# Patient Record
Sex: FEMALE | Race: BLACK OR AFRICAN AMERICAN | Employment: PART TIME | URBAN - METROPOLITAN AREA
[De-identification: names, ages, dates, MRNs, and addresses within clinical notes are randomized per-mention and may not be internally consistent; named-entity substitution may affect disease eponyms.]

---

## 2019-11-21 ENCOUNTER — OFFICE VISIT (OUTPATIENT)
Dept: PRIMARY CARE CLINIC | Age: 34
End: 2019-11-21
Payer: COMMERCIAL

## 2019-11-21 ENCOUNTER — HOSPITAL ENCOUNTER (OUTPATIENT)
Age: 34
Setting detail: SPECIMEN
Discharge: HOME | End: 2019-11-21
Payer: COMMERCIAL

## 2019-11-21 VITALS
WEIGHT: 165 LBS | DIASTOLIC BLOOD PRESSURE: 75 MMHG | OXYGEN SATURATION: 98 % | HEIGHT: 63 IN | HEART RATE: 79 BPM | BODY MASS INDEX: 29.23 KG/M2 | SYSTOLIC BLOOD PRESSURE: 109 MMHG

## 2019-11-21 DIAGNOSIS — N76.0 ACUTE VAGINITIS: Primary | ICD-10-CM

## 2019-11-21 DIAGNOSIS — N76.0 ACUTE VAGINITIS: ICD-10-CM

## 2019-11-21 PROCEDURE — 99202 OFFICE O/P NEW SF 15 MIN: CPT | Performed by: INTERNAL MEDICINE

## 2019-11-21 PROCEDURE — G8427 DOCREV CUR MEDS BY ELIG CLIN: HCPCS | Performed by: INTERNAL MEDICINE

## 2019-11-21 PROCEDURE — G8419 CALC BMI OUT NRM PARAM NOF/U: HCPCS | Performed by: INTERNAL MEDICINE

## 2019-11-21 PROCEDURE — G8484 FLU IMMUNIZE NO ADMIN: HCPCS | Performed by: INTERNAL MEDICINE

## 2019-11-21 PROCEDURE — 1036F TOBACCO NON-USER: CPT | Performed by: INTERNAL MEDICINE

## 2019-11-21 RX ORDER — METRONIDAZOLE 500 MG/1
500 TABLET ORAL 3 TIMES DAILY
Qty: 21 TABLET | Refills: 0 | Status: SHIPPED | OUTPATIENT
Start: 2019-11-21 | End: 2019-11-28

## 2019-11-21 ASSESSMENT — PATIENT HEALTH QUESTIONNAIRE - PHQ9
SUM OF ALL RESPONSES TO PHQ QUESTIONS 1-9: 0
2. FEELING DOWN, DEPRESSED OR HOPELESS: 0
SUM OF ALL RESPONSES TO PHQ QUESTIONS 1-9: 0
1. LITTLE INTEREST OR PLEASURE IN DOING THINGS: 0
SUM OF ALL RESPONSES TO PHQ9 QUESTIONS 1 & 2: 0

## 2019-11-22 LAB
DIRECT EXAM: ABNORMAL
Lab: ABNORMAL
SPECIMEN DESCRIPTION: ABNORMAL

## 2020-01-26 ENCOUNTER — OFFICE VISIT (OUTPATIENT)
Dept: PRIMARY CARE CLINIC | Age: 35
End: 2020-01-26
Payer: COMMERCIAL

## 2020-01-26 VITALS
TEMPERATURE: 97.7 F | HEART RATE: 80 BPM | WEIGHT: 168 LBS | DIASTOLIC BLOOD PRESSURE: 83 MMHG | HEIGHT: 63 IN | BODY MASS INDEX: 29.77 KG/M2 | SYSTOLIC BLOOD PRESSURE: 125 MMHG

## 2020-01-26 PROCEDURE — 1036F TOBACCO NON-USER: CPT | Performed by: FAMILY MEDICINE

## 2020-01-26 PROCEDURE — G8484 FLU IMMUNIZE NO ADMIN: HCPCS | Performed by: FAMILY MEDICINE

## 2020-01-26 PROCEDURE — 99213 OFFICE O/P EST LOW 20 MIN: CPT | Performed by: FAMILY MEDICINE

## 2020-01-26 PROCEDURE — G8427 DOCREV CUR MEDS BY ELIG CLIN: HCPCS | Performed by: FAMILY MEDICINE

## 2020-01-26 PROCEDURE — G8419 CALC BMI OUT NRM PARAM NOF/U: HCPCS | Performed by: FAMILY MEDICINE

## 2020-01-26 RX ORDER — AZITHROMYCIN 250 MG/1
TABLET, FILM COATED ORAL
Qty: 1 PACKET | Refills: 0 | Status: SHIPPED | OUTPATIENT
Start: 2020-01-26 | End: 2020-09-09

## 2020-01-26 RX ORDER — BROMPHENIRAMINE MALEATE, PSEUDOEPHEDRINE HYDROCHLORIDE, AND DEXTROMETHORPHAN HYDROBROMIDE 2; 30; 10 MG/5ML; MG/5ML; MG/5ML
5 SYRUP ORAL 4 TIMES DAILY PRN
Qty: 180 ML | Refills: 1 | Status: SHIPPED | OUTPATIENT
Start: 2020-01-26 | End: 2020-09-09

## 2020-01-26 RX ORDER — FLUTICASONE PROPIONATE 50 MCG
1 SPRAY, SUSPENSION (ML) NASAL 2 TIMES DAILY
Qty: 1 BOTTLE | Refills: 2 | Status: SHIPPED | OUTPATIENT
Start: 2020-01-26 | End: 2020-09-09

## 2020-01-26 ASSESSMENT — ENCOUNTER SYMPTOMS
SINUS PAIN: 1
GASTROINTESTINAL NEGATIVE: 1
ALLERGIC/IMMUNOLOGIC NEGATIVE: 1
CHEST TIGHTNESS: 0
CHOKING: 0
SINUS PRESSURE: 1
SORE THROAT: 1
EYES NEGATIVE: 1
SHORTNESS OF BREATH: 0
COUGH: 1
STRIDOR: 0
RHINORRHEA: 1
WHEEZING: 1

## 2020-01-26 ASSESSMENT — PATIENT HEALTH QUESTIONNAIRE - PHQ9
2. FEELING DOWN, DEPRESSED OR HOPELESS: 0
SUM OF ALL RESPONSES TO PHQ QUESTIONS 1-9: 0
1. LITTLE INTEREST OR PLEASURE IN DOING THINGS: 0
SUM OF ALL RESPONSES TO PHQ QUESTIONS 1-9: 0
SUM OF ALL RESPONSES TO PHQ9 QUESTIONS 1 & 2: 0

## 2020-01-26 NOTE — LETTER
[address] [facility]  [facility] [address]  Phone: [phone]  Fax: [phone]    Murphy Driver MD        January 26, 2020     Patient: Migel Keenan   YOB: 1985   Date of Visit: 1/26/2020       To Whom it May Concern:    Migel Keenan was seen in my clinic on 1/26/2020. She may return to work on 1/28/2020. Please excuse her for 1/25/2020 also . If you have any questions or concerns, please don't hesitate to call.     Sincerely,         Murphy Driver MD

## 2020-01-26 NOTE — PROGRESS NOTES
Products        Health Maintenance   Topic Date Due    Varicella Vaccine (1 of 2 - 2-dose childhood series) 02/24/1988    DTaP/Tdap/Td vaccine (1 - Tdap) 02/24/1998    HIV screen  02/24/2002    Cervical cancer screen  02/24/2008    Flu vaccine (1) 09/01/2019    Pneumococcal 0-64 years Vaccine  Aged Out       Subjective:     Review of Systems   Constitutional: Negative. HENT: Positive for congestion, postnasal drip, rhinorrhea, sinus pressure, sinus pain and sore throat. Eyes: Negative. Respiratory: Positive for cough and wheezing. Negative for choking, chest tightness, shortness of breath and stridor. Cardiovascular: Negative. Gastrointestinal: Negative. Endocrine: Negative. Genitourinary: Negative. Musculoskeletal: Negative. Skin: Negative. Allergic/Immunologic: Negative. Neurological: Negative. Hematological: Negative. Psychiatric/Behavioral: Negative. All other systems reviewed and are negative. Objective:     Physical Exam  Vitals signs and nursing note reviewed. Constitutional:       Appearance: She is well-developed. HENT:      Head: Normocephalic and atraumatic. Right Ear: External ear normal.      Left Ear: External ear normal.      Nose: Congestion and rhinorrhea present. Right Turbinates: Enlarged, swollen and pale. Left Turbinates: Enlarged, swollen and pale. Right Sinus: Maxillary sinus tenderness and frontal sinus tenderness present. Left Sinus: Frontal sinus tenderness present. Eyes:      Conjunctiva/sclera: Conjunctivae normal.      Pupils: Pupils are equal, round, and reactive to light. Neck:      Musculoskeletal: Normal range of motion and neck supple. Cardiovascular:      Rate and Rhythm: Normal rate and regular rhythm. Heart sounds: Normal heart sounds. Pulmonary:      Effort: Pulmonary effort is normal.      Breath sounds: Wheezing present.    Abdominal:      General: Bowel sounds are normal. Palpations: Abdomen is soft. Musculoskeletal: Normal range of motion. Skin:     General: Skin is warm and dry. Neurological:      Mental Status: She is alert and oriented to person, place, and time. Deep Tendon Reflexes: Reflexes are normal and symmetric. Psychiatric:         Behavior: Behavior normal.         Thought Content: Thought content normal.         Judgment: Judgment normal.       /83 (Site: Right Upper Arm, Position: Sitting, Cuff Size: Medium Adult)   Pulse 80   Temp 97.7 °F (36.5 °C) (Oral)   Ht 5' 3\" (1.6 m)   Wt 168 lb (76.2 kg)   BMI 29.76 kg/m²     Assessment:          Diagnosis Orders   1. Acute bacterial sinusitis  azithromycin (ZITHROMAX Z-ERIN) 250 MG tablet    brompheniramine-pseudoephedrine-DM 2-30-10 MG/5ML syrup    fluticasone (FLONASE) 50 MCG/ACT nasal spray   2. Upper respiratory tract infection, unspecified type  azithromycin (ZITHROMAX Z-ERIN) 250 MG tablet    brompheniramine-pseudoephedrine-DM 2-30-10 MG/5ML syrup    fluticasone (FLONASE) 50 MCG/ACT nasal spray             Plan:      Return if symptoms worsen or fail to improve. Fluids and rest.  Ibuprofen as needed fever and pain. Follow-up with PCP 2 to 3 days. No orders of the defined types were placed in this encounter. Orders Placed This Encounter   Medications    azithromycin (ZITHROMAX Z-ERIN) 250 MG tablet     Sig: Take 2 tablets (500 mg) on Day 1, and then take 1 tablet (250 mg) on days 2 through 5. Dispense:  1 packet     Refill:  0    brompheniramine-pseudoephedrine-DM 2-30-10 MG/5ML syrup     Sig: Take 5 mLs by mouth 4 times daily as needed for Cough     Dispense:  180 mL     Refill:  1    fluticasone (FLONASE) 50 MCG/ACT nasal spray     Si spray by Nasal route 2 times daily for 14 days     Dispense:  1 Bottle     Refill:  2       Patient given educational materials - see patient instructions. Discussed use, benefit, and side effects of prescribed medications.   All patientquestions answered. Pt voiced understanding. Reviewed health maintenance. Instructedto continue current medications, diet and exercise. Patient agreed with treatmentplan. Follow up as directed.      Electronically signed by Zahida Durbin MD on 1/26/2020 at 10:23 AM

## 2020-09-09 ENCOUNTER — HOSPITAL ENCOUNTER (OUTPATIENT)
Age: 35
Setting detail: SPECIMEN
Discharge: HOME | End: 2020-09-09
Payer: COMMERCIAL

## 2020-09-09 ENCOUNTER — OFFICE VISIT (OUTPATIENT)
Dept: PRIMARY CARE CLINIC | Age: 35
End: 2020-09-09
Payer: COMMERCIAL

## 2020-09-09 VITALS
DIASTOLIC BLOOD PRESSURE: 57 MMHG | TEMPERATURE: 99.1 F | HEART RATE: 80 BPM | BODY MASS INDEX: 29.76 KG/M2 | WEIGHT: 168 LBS | SYSTOLIC BLOOD PRESSURE: 120 MMHG | OXYGEN SATURATION: 97 %

## 2020-09-09 LAB
DIRECT EXAM: ABNORMAL
Lab: ABNORMAL
SPECIMEN DESCRIPTION: ABNORMAL

## 2020-09-09 PROCEDURE — G8427 DOCREV CUR MEDS BY ELIG CLIN: HCPCS | Performed by: INTERNAL MEDICINE

## 2020-09-09 PROCEDURE — 1036F TOBACCO NON-USER: CPT | Performed by: INTERNAL MEDICINE

## 2020-09-09 PROCEDURE — 99213 OFFICE O/P EST LOW 20 MIN: CPT | Performed by: INTERNAL MEDICINE

## 2020-09-09 PROCEDURE — G8419 CALC BMI OUT NRM PARAM NOF/U: HCPCS | Performed by: INTERNAL MEDICINE

## 2020-09-09 RX ORDER — METRONIDAZOLE 500 MG/1
500 TABLET ORAL 3 TIMES DAILY
Qty: 30 TABLET | Refills: 0 | Status: SHIPPED | OUTPATIENT
Start: 2020-09-09 | End: 2020-09-19

## 2020-09-09 RX ORDER — FLUCONAZOLE 150 MG/1
150 TABLET ORAL ONCE
Qty: 1 TABLET | Refills: 1 | Status: SHIPPED | OUTPATIENT
Start: 2020-09-09 | End: 2020-09-09

## 2020-09-09 NOTE — PROGRESS NOTES
[address] [place] [address]  Dept: [phone]  Dept Fax: [phone]    Aneudy Clinton is a 37 y.o. female who presents to the urgent care today for her medicalconditions/complaints as noted below. Aneudy Clinton is c/o of Vaginal Discharge (yellow discharge, onset 3-4 days ago, has a fish like odor )      HPI:     Vaginal Discharge   The patient's primary symptoms include vaginal discharge. This is a new problem. The current episode started in the past 7 days. The problem has been unchanged. The patient is experiencing no pain. The vaginal discharge was malodorous, white and thick. There has been no bleeding. Nothing aggravates the symptoms. She has tried nothing for the symptoms. The treatment provided no relief. No past medical history on file. Current Outpatient Medications   Medication Sig Dispense Refill    Lurasidone HCl (LATUDA PO) Take by mouth      metroNIDAZOLE (FLAGYL) 500 MG tablet Take 1 tablet by mouth 3 times daily for 10 days 30 tablet 0    fluconazole (DIFLUCAN) 150 MG tablet Take 1 tablet by mouth once for 1 dose 1 tablet 1     No current facility-administered medications for this visit. Allergies   Allergen Reactions    Peanut-Containing Drug Products        Health Maintenance   Topic Date Due    Varicella vaccine (1 of 2 - 2-dose childhood series) 02/24/1988    HIV screen  02/24/2002    DTaP/Tdap/Td vaccine (1 - Tdap) 02/24/2006    Cervical cancer screen  02/24/2008    Flu vaccine (1) 09/01/2020    Hepatitis A vaccine  Aged Out    Hepatitis B vaccine  Aged Out    Hib vaccine  Aged Out    Meningococcal (ACWY) vaccine  Aged Out    Pneumococcal 0-64 years Vaccine  Aged Out       Subjective:      Review of Systems   Genitourinary: Positive for vaginal discharge. All other systems reviewed and are negative. Objective:     Physical Exam  Vitals signs reviewed.    Constitutional:       Appearance: Normal appearance. HENT:      Head: Normocephalic and atraumatic. Skin:     General: Skin is warm and dry. Neurological:      General: No focal deficit present. Mental Status: She is alert and oriented to person, place, and time. Psychiatric:         Mood and Affect: Mood normal.         Behavior: Behavior normal.       BP (!) 120/57 (Site: Right Upper Arm, Position: Sitting, Cuff Size: Small Adult)   Pulse 80   Temp 99.1 °F (37.3 °C) (Temporal)   Wt 168 lb (76.2 kg)   SpO2 97%   BMI 29.76 kg/m²       Assessment:       Diagnosis Orders   1. Acute vaginitis  Vaginitis DNA Probe       Plan:      No follow-ups on file. Orders Placed This Encounter   Medications    metroNIDAZOLE (FLAGYL) 500 MG tablet     Sig: Take 1 tablet by mouth 3 times daily for 10 days     Dispense:  30 tablet     Refill:  0    fluconazole (DIFLUCAN) 150 MG tablet     Sig: Take 1 tablet by mouth once for 1 dose     Dispense:  1 tablet     Refill:  1     Orders Placed This Encounter   Procedures    Vaginitis DNA Probe     Standing Status:   Future     Standing Expiration Date:   9/9/2021            Patient given educational materials - see patient instructions. Discussed use, benefit, and side effects of prescribed medications. All patientquestions answered. Pt voiced understanding.     Electronically signed by Joanie Griffiths MD on 9/9/2020at 1:55 PM

## 2020-12-22 ENCOUNTER — HOSPITAL ENCOUNTER (OUTPATIENT)
Age: 35
Setting detail: SPECIMEN
Discharge: HOME | End: 2020-12-22
Payer: COMMERCIAL

## 2020-12-22 LAB
ABSOLUTE EOS #: 0.24 K/UL (ref 0–0.44)
ABSOLUTE IMMATURE GRANULOCYTE: <0.03 K/UL (ref 0–0.3)
ABSOLUTE LYMPH #: 1.9 K/UL (ref 1.1–3.7)
ABSOLUTE MONO #: 0.42 K/UL (ref 0.1–1.2)
ALBUMIN SERPL-MCNC: 4.1 G/DL (ref 3.5–5.2)
ALBUMIN/GLOBULIN RATIO: 1.3 (ref 1–2.5)
ALP BLD-CCNC: 65 U/L (ref 35–104)
ALT SERPL-CCNC: 14 U/L (ref 5–33)
ANION GAP SERPL CALCULATED.3IONS-SCNC: 10 MMOL/L (ref 9–17)
AST SERPL-CCNC: 17 U/L
BASOPHILS # BLD: 1 % (ref 0–2)
BASOPHILS ABSOLUTE: 0.05 K/UL (ref 0–0.2)
BILIRUB SERPL-MCNC: 0.23 MG/DL (ref 0.3–1.2)
BUN BLDV-MCNC: 10 MG/DL (ref 6–20)
BUN/CREAT BLD: ABNORMAL (ref 9–20)
CALCIUM SERPL-MCNC: 9.2 MG/DL (ref 8.6–10.4)
CHLORIDE BLD-SCNC: 108 MMOL/L (ref 98–107)
CHOLESTEROL/HDL RATIO: 2.4
CHOLESTEROL: 197 MG/DL
CO2: 20 MMOL/L (ref 20–31)
CREAT SERPL-MCNC: 0.55 MG/DL (ref 0.5–0.9)
DIFFERENTIAL TYPE: ABNORMAL
DIRECT EXAM: ABNORMAL
EOSINOPHILS RELATIVE PERCENT: 6 % (ref 1–4)
GFR AFRICAN AMERICAN: >60 ML/MIN
GFR NON-AFRICAN AMERICAN: >60 ML/MIN
GFR SERPL CREATININE-BSD FRML MDRD: ABNORMAL ML/MIN/{1.73_M2}
GFR SERPL CREATININE-BSD FRML MDRD: ABNORMAL ML/MIN/{1.73_M2}
GLUCOSE BLD-MCNC: 115 MG/DL (ref 70–99)
HCT VFR BLD CALC: 41.5 % (ref 36.3–47.1)
HDLC SERPL-MCNC: 83 MG/DL
HEMOGLOBIN: 13.1 G/DL (ref 11.9–15.1)
IMMATURE GRANULOCYTES: 0 %
LDL CHOLESTEROL: 101 MG/DL (ref 0–130)
LYMPHOCYTES # BLD: 48 % (ref 24–43)
Lab: ABNORMAL
MCH RBC QN AUTO: 26.3 PG (ref 25.2–33.5)
MCHC RBC AUTO-ENTMCNC: 31.6 G/DL (ref 28.4–34.8)
MCV RBC AUTO: 83.3 FL (ref 82.6–102.9)
MONOCYTES # BLD: 10 % (ref 3–12)
NRBC AUTOMATED: 0 PER 100 WBC
PDW BLD-RTO: 13.7 % (ref 11.8–14.4)
PLATELET # BLD: 281 K/UL (ref 138–453)
PLATELET ESTIMATE: ABNORMAL
PMV BLD AUTO: 12 FL (ref 8.1–13.5)
POTASSIUM SERPL-SCNC: 4.3 MMOL/L (ref 3.7–5.3)
RBC # BLD: 4.98 M/UL (ref 3.95–5.11)
RBC # BLD: ABNORMAL 10*6/UL
SEG NEUTROPHILS: 35 % (ref 36–65)
SEGMENTED NEUTROPHILS ABSOLUTE COUNT: 1.43 K/UL (ref 1.5–8.1)
SODIUM BLD-SCNC: 138 MMOL/L (ref 135–144)
SPECIMEN DESCRIPTION: ABNORMAL
T. PALLIDUM, IGG: NONREACTIVE
TOTAL PROTEIN: 7.2 G/DL (ref 6.4–8.3)
TRIGL SERPL-MCNC: 63 MG/DL
VLDLC SERPL CALC-MCNC: NORMAL MG/DL (ref 1–30)
WBC # BLD: 4.1 K/UL (ref 3.5–11.3)
WBC # BLD: ABNORMAL 10*3/UL

## 2020-12-23 LAB
C. TRACHOMATIS DNA ,URINE: NEGATIVE
HIV AG/AB: NONREACTIVE
N. GONORRHOEAE DNA, URINE: NEGATIVE
SOURCE: ABNORMAL
SPECIMEN DESCRIPTION: NORMAL
TRICHOMONAS VAGINALI, MOLECULAR: POSITIVE

## 2021-01-14 ENCOUNTER — HOSPITAL ENCOUNTER (EMERGENCY)
Age: 36
Discharge: HOME | End: 2021-01-14
Attending: EMERGENCY MEDICINE
Payer: COMMERCIAL

## 2021-01-14 VITALS
BODY MASS INDEX: 26.68 KG/M2 | HEIGHT: 62 IN | RESPIRATION RATE: 18 BRPM | WEIGHT: 145 LBS | SYSTOLIC BLOOD PRESSURE: 119 MMHG | TEMPERATURE: 99 F | HEART RATE: 84 BPM | OXYGEN SATURATION: 98 % | DIASTOLIC BLOOD PRESSURE: 81 MMHG

## 2021-01-14 DIAGNOSIS — N39.0 URINARY TRACT INFECTION WITH HEMATURIA, SITE UNSPECIFIED: Primary | ICD-10-CM

## 2021-01-14 DIAGNOSIS — R31.9 URINARY TRACT INFECTION WITH HEMATURIA, SITE UNSPECIFIED: Primary | ICD-10-CM

## 2021-01-14 LAB
-: ABNORMAL
AMORPHOUS: ABNORMAL
BACTERIA: ABNORMAL
BILIRUBIN URINE: ABNORMAL
CASTS UA: ABNORMAL /LPF (ref 0–2)
COLOR: ABNORMAL
CRYSTALS, UA: ABNORMAL /HPF
EPITHELIAL CELLS UA: ABNORMAL /HPF (ref 0–5)
GLUCOSE URINE: NEGATIVE
HCG(URINE) PREGNANCY TEST: NEGATIVE
KETONES, URINE: ABNORMAL
LEUKOCYTE ESTERASE, URINE: ABNORMAL
MUCUS: ABNORMAL
NITRITE, URINE: POSITIVE
OTHER OBSERVATIONS UA: ABNORMAL
PH UA: 6.5 (ref 5–8)
PROTEIN UA: ABNORMAL
RBC UA: ABNORMAL /HPF (ref 0–2)
RENAL EPITHELIAL, UA: ABNORMAL /HPF
SPECIFIC GRAVITY UA: 1.02 (ref 1–1.03)
TRICHOMONAS: ABNORMAL
TURBIDITY: ABNORMAL
URINE HGB: ABNORMAL
UROBILINOGEN, URINE: NORMAL
WBC UA: ABNORMAL /HPF (ref 0–5)
YEAST: ABNORMAL

## 2021-01-14 PROCEDURE — 6370000000 HC RX 637 (ALT 250 FOR IP): Performed by: STUDENT IN AN ORGANIZED HEALTH CARE EDUCATION/TRAINING PROGRAM

## 2021-01-14 PROCEDURE — 99283 EMERGENCY DEPT VISIT LOW MDM: CPT

## 2021-01-14 PROCEDURE — 81025 URINE PREGNANCY TEST: CPT

## 2021-01-14 PROCEDURE — 81001 URINALYSIS AUTO W/SCOPE: CPT

## 2021-01-14 RX ORDER — PHENAZOPYRIDINE HYDROCHLORIDE 200 MG/1
200 TABLET, FILM COATED ORAL 3 TIMES DAILY PRN
Qty: 6 TABLET | Refills: 0 | Status: SHIPPED | OUTPATIENT
Start: 2021-01-14 | End: 2021-01-17

## 2021-01-14 RX ORDER — PHENAZOPYRIDINE HYDROCHLORIDE 100 MG/1
200 TABLET, FILM COATED ORAL ONCE
Status: COMPLETED | OUTPATIENT
Start: 2021-01-14 | End: 2021-01-14

## 2021-01-14 RX ORDER — CEPHALEXIN 500 MG/1
500 CAPSULE ORAL 2 TIMES DAILY
Qty: 28 CAPSULE | Refills: 0 | Status: SHIPPED | OUTPATIENT
Start: 2021-01-14 | End: 2021-01-28

## 2021-01-14 RX ADMIN — PHENAZOPYRIDINE HYDROCHLORIDE 200 MG: 100 TABLET ORAL at 12:49

## 2021-01-14 NOTE — ED NOTES
Pt arrived to ED alert and oriented x4. Pt c/o bladder spasms and pain x2 days. Pt reports that she noticed blood in her urine as well, states that she has not had symptoms like this before. Pt denies having been around anyone suspected to have COVID-19 or anyone that has been sick, denies recent travel outside the [facility] or [address]. RR even and unlabored. NAD noted. Will continue with plan of care.      Rosendo Kiser RN  01/14/21 8534

## 2021-01-14 NOTE — ED PROVIDER NOTES
[facility]  Emergency Department  Faculty Attestation     I performed a history and physical examination of the patient and discussed management with the resident. I reviewed the residents note and agree with the documented findings and plan of care. Any areas of disagreement are noted on the chart. I was personally present for the key portions of any procedures. I have documented in the chart those procedures where I was not present during the key portions. I have reviewed the emergency nurses triage note. I agree with the chief complaint, past medical history, past surgical history, allergies, medications, social and family history as documented unless otherwise noted below. For Physician Assistant/ Nurse Practitioner cases/documentation I have personally evaluated this patient and have completed at least one if not all key elements of the E/M (history, physical exam, and MDM). Additional findings are as noted. Primary Care Physician:  No primary care provider on file. Screenings:  [unfilled]    CHIEF COMPLAINT       Chief Complaint   Patient presents with    Flank Pain    Hematuria       RECENT VITALS:   Temp: 99 °F (37.2 °C),  Pulse: 84, Resp: 18, BP: 119/81    LABS:  Labs Reviewed   URINALYSIS WITH MICROSCOPIC   PREGNANCY, URINE       Radiology  No orders to display         Attending Physician Additional  Notes    Urinary symptoms since intercourse yesterday. Dysuria, frequency, hematuria, spot of blood from urethral meatus. No vaginal complaints. No flank pain, fever, vomiting. Afebrile, nontoxic, vitals normal. Impression is hematuria, likely hemorrhagic cystitis, urethritis. Plan is urinalysis, culture, antibiotics, followup. If no evidence of infection, then follow to urology and pelvic. Ryan Rodriguez.  Simba Woodward MD, [facility]  Attending Emergency  Physician                Braulio Mccain MD  01/14/21 2410

## 2021-01-14 NOTE — ED PROVIDER NOTES
[address]  Emergency Department Encounter  Emergency Medicine Resident         This patient was evaluated in the Emergency Department for symptoms described in the history of present illness. He/she was evaluated in the context of the global COVID-19 pandemic, which necessitated consideration that the patient might be at risk for infection with the SARS-CoV-2 virus that causes COVID-19. Institutional protocols and algorithms that pertain to the evaluation of patients at risk for COVID-19 are in a state of rapid change based on information released by regulatory bodies including the CDC and federal and state organizations. These policies and algorithms were followed during the patient's care in the ED. Pt Name: Junior Ordonez  MRN: [id number]  [place] 1985  Date of evaluation: 1/14/21  PCP:  No primary care provider on file. CHIEF COMPLAINT       Chief Complaint   Patient presents with    Flank Pain    Hematuria       HISTORY OF PRESENT ILLNESS  (Location/Symptom, Timing/Onset, Context/Setting, Quality, Duration, Modifying Factors, Severity.)    Junior Ordonez is a 37 y.o. female who presents with area frequency and specks of blood in urine x2 days. Feels like UTI. Denies nausea vomiting fever chills abdominal pain vaginal bleeding or discharge. No treatments prior to arrival.  Denies past medical past surgical history. Denies pregnancy.           PAST MEDICAL / SURGICAL / SOCIAL / FAMILY HISTORY       Social History     Socioeconomic History    Marital status: Single     Spouse name: Not on file    Number of children: Not on file    Years of education: Not on file    Highest education level: Not on file   Occupational History    Not on file   Social Needs    Financial resource strain: Not on file    Food insecurity     Worry: Not on file     Inability: Not on file    Transportation needs     Medical: Not on file     Non-medical: Not on file   Tobacco Use    Smoking status: Never Smoker    Smokeless tobacco: Never Used   Substance and Sexual Activity    Alcohol use: Not on file    Drug use: Not on file    Sexual activity: Not on file   Lifestyle    Physical activity     Days per week: Not on file     Minutes per session: Not on file    Stress: Not on file   Relationships    Social connections     Talks on phone: Not on file     Gets together: Not on file     Attends Religion service: Not on file     Active member of club or organization: Not on file     Attends meetings of clubs or organizations: Not on file     Relationship status: Not on file    Intimate partner violence     Fear of current or ex partner: Not on file     Emotionally abused: Not on file     Physically abused: Not on file     Forced sexual activity: Not on file   Other Topics Concern    Not on file   Social History Narrative    Not on file       No family history on file. Allergies:    Peanut-containing drug products    Home Medications:  Prior to Admission medications    Medication Sig Start Date End Date Taking? Authorizing Provider   cephALEXin (KEFLEX) 500 MG capsule Take 1 capsule by mouth 2 times daily for 14 days 1/14/21 1/28/21 Yes Neil Tate, DO   phenazopyridine (PYRIDIUM) 200 MG tablet Take 1 tablet by mouth 3 times daily as needed for Pain (bladder spasm/pain) 1/14/21 1/17/21 Yes Yosvany Tate, DO   Lurasidone HCl (LATUDA PO) Take by mouth    Historical Provider, MD       REVIEW OF SYSTEMS    (2-9 systems for level 4, 10 or more for level 5)    A 10 point review of systems was performed and negative unless otherwise specified in HPI  Gen: No Fever, No chills  EYES: No blurry visiion, no double vision  HENT: No sore throat, No runny nose.  No cough  CV: No CP , No palpitation  RESP: No SOB, No respiratory distress  GI: No N/V, No Abdm pain  : No dysuria  SKIN: No rash  MSK: No back pain, no joint pain  NEURO: No HA, no weakness  PSYCH: No SI/HI        PHYSICAL EXAM   (up to 7 for level 4, 8 or more for level 5)     INITIAL VITALS:     /81   Pulse 84   Temp 99 °F (37.2 °C) (Oral)   Resp 18   Ht 5' 2\" (1.575 m)   Wt 145 lb (65.8 kg)   SpO2 98%   BMI 26.52 kg/m²     Physical Exam  GENERAL: Not in acute distress, well developed, not diaphoretic  HENT: normocephalic, nose nml  EYES: No sclearal icterus, no occular discharge  NECK: No JVD, trachea midline  PULM: Effort normal, no audible wheezing or stridor  NEURO: Alert, awake, responsive  Abdomen: Soft nontender nondistended no rebound or guarding no suprapubic tenderness palpation no CVA tenderness.  examination deferred per patient request    DIFFERENTIAL  DIAGNOSIS/ MDM   PLAN (LABS / IMAGING / EKG):  Orders Placed This Encounter   Procedures    Urinalysis with microscopic    PREGNANCY, URINE       MEDICATIONS ORDERED:  Orders Placed This Encounter   Medications    phenazopyridine (PYRIDIUM) tablet 200 mg    cephALEXin (KEFLEX) 500 MG capsule     Sig: Take 1 capsule by mouth 2 times daily for 14 days     Dispense:  28 capsule     Refill:  0    phenazopyridine (PYRIDIUM) 200 MG tablet     Sig: Take 1 tablet by mouth 3 times daily as needed for Pain (bladder spasm/pain)     Dispense:  6 tablet     Refill:  0         MDM:    Qian Maldonado is a 37 y.o. female who presents with symptoms concerning for possible UTI. Bedside point-of-care ultrasound of kidneys performed no hydronephrosis present. UA Pyridium urine pregnancy. Nontoxic otherwise well-appearing unremarkable vital signs not feel laboratory work-up or imaging is needed at this time. Has UTI, pyridium x2 days, Keflex x7. Return if worse or progress. RENAL ULTRASOUND:     A limited, bedside ultrasound of the kidneys was performed. The medical necessity was to evaluate for hydronephrosis in a patient with possible renal colic. The structures studied were the kidney, including the renal cortex and collecting system.     FINDINGS:    negative for hydronephrosis. The study was technically adequate. IMAGES:  Electronically uploaded to the PACS system            EMERGENCY DEPARTMENT COURSE:  ED Course as of Jan 14 1433   Thu Jan 14, 2021   1423 HCG(Urine) Pregnancy Test: NEGATIVE [RB]      ED Course User Index  [RB] Manjeetedmund VarelacherDO         DIAGNOSTIC RESULTS / EMERGENCYDEPARTMENT COURSE   LABS:  Labs Reviewed   URINALYSIS WITH MICROSCOPIC - Abnormal; Notable for the following components:       Result Value    Color, UA DARK YELLOW (*)     Turbidity UA TURBID (*)     Bilirubin Urine NEGATIVE  Verified by ictotest. (*)     Ketones, Urine TRACE (*)     Urine Hgb LARGE (*)     Protein, UA 2+ (*)     Nitrite, Urine POSITIVE (*)     Leukocyte Esterase, Urine LARGE (*)     All other components within normal limits   PREGNANCY, URINE       RADIOLOGY:  No results found. CONSULTS:  None    CRITICAL CARE:  Please see attending note    FINAL IMPRESSION     1. Urinary tract infection with hematuria, site unspecified          DISPOSITION / PLAN   DISPOSITION         Evaluation and treatment course in the ED, and plan of care upon discharge was discussed in length with the patient. Patient had no further questions prior to being discharged and was instructed to return to the ED for new or worsening symptoms. Any changes to existing medications or new prescriptions were reviewed with patient and they expressed understanding of how to correctly take their medications and the possible common side effects. The patient understands that at this time there is no evidence for a more malignant underlying process, but the patient also understands that early in the process of an illness or injury, an emergency department workup can be falsely reassuring.   Routine discharge counseling was given, and the patient understands that worsening, changing or persistent symptoms should prompt an immediate call or follow up with his/her primary physician or return to the emergency department. The importance of appropriate follow up was also discussed. More extensive discharge instructions were given in the patients discharge paperwork. PATIENT REFERRED TO:  [facility] AT [address] [address] [phone]  Schedule an appointment as soon as possible for a visit in 2 days  Return to the ER if worsening or any other concern      DISCHARGE MEDICATIONS:  New Prescriptions    CEPHALEXIN (KEFLEX) 500 MG CAPSULE    Take 1 capsule by mouth 2 times daily for 14 days    PHENAZOPYRIDINE (PYRIDIUM) 200 MG TABLET    Take 1 tablet by mouth 3 times daily as needed for Pain (bladder spasm/pain)       Dr. Luba Zuniga.  [address] [place] [address]  Emergency Medicine Resident Physician, PGY-3    (Please note that portions of this note were completed with a voice recognition program.  Efforts were made to edit the dictations but occasionally words are mis-transcribed.)          Viviane Barnes DO  Resident  01/14/21 6721

## 2021-02-12 ENCOUNTER — OFFICE VISIT (OUTPATIENT)
Dept: OBGYN CLINIC | Age: 36
End: 2021-02-12
Payer: COMMERCIAL

## 2021-02-12 VITALS
HEART RATE: 58 BPM | DIASTOLIC BLOOD PRESSURE: 59 MMHG | BODY MASS INDEX: 26.22 KG/M2 | SYSTOLIC BLOOD PRESSURE: 90 MMHG | WEIGHT: 142.5 LBS | HEIGHT: 62 IN

## 2021-02-12 DIAGNOSIS — N92.6 PROLONGED PERIODS: Primary | ICD-10-CM

## 2021-02-12 DIAGNOSIS — F43.9 STRESS: ICD-10-CM

## 2021-02-12 DIAGNOSIS — Z87.891 QUIT SMOKING: ICD-10-CM

## 2021-02-12 PROCEDURE — G8484 FLU IMMUNIZE NO ADMIN: HCPCS | Performed by: OBSTETRICS & GYNECOLOGY

## 2021-02-12 PROCEDURE — 1036F TOBACCO NON-USER: CPT | Performed by: OBSTETRICS & GYNECOLOGY

## 2021-02-12 PROCEDURE — 99203 OFFICE O/P NEW LOW 30 MIN: CPT | Performed by: OBSTETRICS & GYNECOLOGY

## 2021-02-12 PROCEDURE — G8427 DOCREV CUR MEDS BY ELIG CLIN: HCPCS | Performed by: OBSTETRICS & GYNECOLOGY

## 2021-02-12 PROCEDURE — G8419 CALC BMI OUT NRM PARAM NOF/U: HCPCS | Performed by: OBSTETRICS & GYNECOLOGY

## 2021-02-12 ASSESSMENT — ENCOUNTER SYMPTOMS
BACK PAIN: 0
COUGH: 0
ABDOMINAL PAIN: 0
SHORTNESS OF BREATH: 0

## 2021-02-12 NOTE — PROGRESS NOTES
Constitutional:       Appearance: Normal appearance. She is normal weight. HENT:      Head: Normocephalic. Eyes:      Extraocular Movements: Extraocular movements intact. Pulmonary:      Effort: Pulmonary effort is normal.   Neurological:      Mental Status: She is alert and oriented to person, place, and time. Psychiatric:         Mood and Affect: Mood normal.         Behavior: Behavior normal.         Thought Content: Thought content normal.         Judgment: Judgment normal.         Assessment/Plan  1. Prolonged periods  2. Stress  3. Quit smoking  - This is a single episode of a prolonged period for this patient. - Pt has had multiple stressors in a short period of time, so we discussed that likely her change in the period is due to all of her stress. - If her irregular bleeding continues will perform labs and TVUS, but will just observe at this time. - Pt given option to see if the period stops on it's own vs stopping with aygestin. Pt at this time would like to see if it resolves without any medication, but if it doesn't she will take the medication.    - Discussed that her period might be irregular after this prolonged period, or it might be regular, but there is no way to know ahead of time. Pt acknowledges understanding.         Pt to follow up for her annual exam  Lane Roach MD  [address]

## 2021-12-23 ENCOUNTER — OFFICE VISIT (OUTPATIENT)
Dept: FAMILY MEDICINE CLINIC | Age: 36
End: 2021-12-23
Payer: COMMERCIAL

## 2021-12-23 VITALS
DIASTOLIC BLOOD PRESSURE: 75 MMHG | HEART RATE: 75 BPM | OXYGEN SATURATION: 98 % | TEMPERATURE: 97.1 F | SYSTOLIC BLOOD PRESSURE: 117 MMHG

## 2021-12-23 DIAGNOSIS — J01.90 ACUTE BACTERIAL SINUSITIS: Primary | ICD-10-CM

## 2021-12-23 DIAGNOSIS — B96.89 ACUTE BACTERIAL SINUSITIS: Primary | ICD-10-CM

## 2021-12-23 PROCEDURE — 99213 OFFICE O/P EST LOW 20 MIN: CPT | Performed by: NURSE PRACTITIONER

## 2021-12-23 PROCEDURE — G8419 CALC BMI OUT NRM PARAM NOF/U: HCPCS | Performed by: NURSE PRACTITIONER

## 2021-12-23 PROCEDURE — 1036F TOBACCO NON-USER: CPT | Performed by: NURSE PRACTITIONER

## 2021-12-23 PROCEDURE — G8484 FLU IMMUNIZE NO ADMIN: HCPCS | Performed by: NURSE PRACTITIONER

## 2021-12-23 PROCEDURE — G8427 DOCREV CUR MEDS BY ELIG CLIN: HCPCS | Performed by: NURSE PRACTITIONER

## 2021-12-23 RX ORDER — AMOXICILLIN AND CLAVULANATE POTASSIUM 875; 125 MG/1; MG/1
1 TABLET, FILM COATED ORAL 2 TIMES DAILY
Qty: 20 TABLET | Refills: 0 | Status: SHIPPED | OUTPATIENT
Start: 2021-12-23 | End: 2022-01-02

## 2021-12-23 ASSESSMENT — ENCOUNTER SYMPTOMS
VOMITING: 0
SORE THROAT: 1
NAUSEA: 0
EYE DISCHARGE: 0
DIARRHEA: 0
SHORTNESS OF BREATH: 0
EYE ITCHING: 0
COUGH: 1
ALLERGIC/IMMUNOLOGIC NEGATIVE: 1
CHEST TIGHTNESS: 0
ABDOMINAL PAIN: 0
EYES NEGATIVE: 1

## 2021-12-23 NOTE — PROGRESS NOTES
[address] [facility]-IN FAMILY [facility]   [place] Keshav Hunter  Dept: [phone]  Dept Fax: [phone]    Shaun Garduno is a 31 y.o. female who presents today forher medical conditions/complaints as noted below. Shaun Garduno is c/o of   Chief Complaint   Patient presents with    Congestion     onset since last night    Other     throat irritation     Chest Congestion     HPI:     URI   This is a new problem. The current episode started yesterday. The problem has been gradually worsening. Associated symptoms include congestion, coughing and a sore throat. Pertinent negatives include no abdominal pain, chest pain, diarrhea, dysuria, headaches, nausea, neck pain, rash or vomiting. Associated symptoms comments: Throat irritation. .     Past Medical History:   Diagnosis Date    Abnormal Pap smear of cervix       History reviewed. No pertinent surgical history. History reviewed. No pertinent family history. Social History     Tobacco Use    Smoking status: Former Smoker    Smokeless tobacco: Never Used   Substance Use Topics    Alcohol use: Not Currently      Current Outpatient Medications   Medication Sig Dispense Refill    amoxicillin-clavulanate (AUGMENTIN) 875-125 MG per tablet Take 1 tablet by mouth 2 times daily for 10 days 20 tablet 0    norethindrone (AYGESTIN) 5 MG tablet Take 1 tablet by mouth 3 times daily for 1 day, THEN 1 tablet 2 times daily for 2 days, THEN 1 tablet daily for 4 days. 11 tablet 0    Lurasidone HCl (LATUDA PO) Take by mouth (Patient not taking: Reported on 12/23/2021)       No current facility-administered medications for this visit. Allergies   Allergen Reactions    Peanut-Containing Drug Products      Subjective:      Review of Systems   Constitutional: Negative for appetite change, chills, fatigue and fever. HENT: Positive for congestion and sore throat. Negative for postnasal drip. Eyes: Negative. Negative for discharge and itching. Respiratory: Positive for cough. Negative for chest tightness and shortness of breath. Cardiovascular: Negative for chest pain, palpitations and leg swelling. Gastrointestinal: Negative for abdominal pain, diarrhea, nausea and vomiting. Endocrine: Negative. Genitourinary: Negative for dysuria, frequency and urgency. Musculoskeletal: Negative for neck pain and neck stiffness. Skin: Negative for rash. Allergic/Immunologic: Negative. Neurological: Negative for dizziness, weakness, light-headedness and headaches. Hematological: Negative for adenopathy. Psychiatric/Behavioral: Negative for confusion. Objective:      Physical Exam  Vitals reviewed. Constitutional:       Appearance: She is well-developed. HENT:      Head: Normocephalic. Right Ear: External ear normal. A middle ear effusion is present. Left Ear: External ear normal. A middle ear effusion is present. Nose:      Right Sinus: Frontal sinus tenderness present. Left Sinus: Frontal sinus tenderness present. Mouth/Throat:      Pharynx: Posterior oropharyngeal erythema present. Eyes:      Conjunctiva/sclera: Conjunctivae normal.      Pupils: Pupils are equal, round, and reactive to light. Cardiovascular:      Rate and Rhythm: Normal rate and regular rhythm. Heart sounds: Normal heart sounds, S1 normal and S2 normal. No murmur heard. No friction rub. No gallop. Pulmonary:      Effort: Pulmonary effort is normal. No respiratory distress. Breath sounds: Normal breath sounds. No stridor, decreased air movement or transmitted upper airway sounds. No decreased breath sounds, wheezing, rhonchi or rales. Abdominal:      General: Bowel sounds are normal.      Palpations: Abdomen is soft. Musculoskeletal:         General: Normal range of motion. Cervical back: Normal range of motion and neck supple. Lymphadenopathy:      Cervical: No cervical adenopathy. Skin:     General: Skin is warm and dry. Findings: No rash. Neurological:      Mental Status: She is alert and oriented to person, place, and time. Cranial Nerves: No cranial nerve deficit. Coordination: Coordination normal.      Deep Tendon Reflexes: Reflexes are normal and symmetric. Psychiatric:         Thought Content: Thought content normal.       /75 (Site: Left Upper Arm, Position: Sitting, Cuff Size: Medium Adult)   Pulse 75   Temp 97.1 °F (36.2 °C) (Infrared)   SpO2 98%     Assessment:       Diagnosis Orders   1. Acute bacterial sinusitis  amoxicillin-clavulanate (AUGMENTIN) 875-125 MG per tablet           Plan:     1.) Start Augmentin today  2.) OTC meds for symptom management   3.) RTO PRN   4.) Follow-up with PCP     Problem List     None         Patient given educationalmaterials - see patient instructions. Discussed use, benefit, and side effectsof prescribed medications. All patient questions answered. Pt verbalized understanding. Instructed to continue current medications, diet and exercise. Patient agreedwith treatment plan. Follow up as directed.      Electronically signed by ZULEIMA Ramirez CNP on 12/23/2021 at 5:32 PM

## 2021-12-23 NOTE — PATIENT INSTRUCTIONS
control (condom, diaphragm, cervical cap, or contraceptive sponge) to prevent pregnancy. Do not give this medicine to a child without medical advice. How should I take amoxicillin and clavulanate potassium? Follow all directions on your prescription label and read all medication guides or instruction sheets. Use the medicine exactly as directed. Amoxicillin and clavulanate potassium may work best if you take it at the start of a meal.  Take the medicine every 12 hours. Do not crush or chew the extended-release tablet. Swallow the pill whole, or break the pill in half and take both halves one at a time. Tell your doctor if you have trouble swallowing a whole or half pill. You must chew the chewable tablet before you swallow it. Shake the oral suspension (liquid) before you measure a dose. Use the dosing syringe provided, or use a medicine dose-measuring device (not a kitchen spoon). This medicine can affect the results of certain medical tests. Tell any doctor who treats you that you are using amoxicillin and clavulanate potassium. Use this medicine for the full prescribed length of time, even if your symptoms quickly improve. Skipping doses can increase your risk of infection that is resistant to medication. Amoxicillin and clavulanate potassium will not treat a viral infection such as the flu or a common cold. Store the tablets at room temperature away from moisture and heat. Store the liquid  in the refrigerator. Throw away any unused liquid after 10 days. What happens if I miss a dose? Take the medicine as soon as you can, but skip the missed dose if it is almost time for your next dose. Do not take two doses at one time. What happens if I overdose? Seek emergency medical attention or call the Poison Help line at 1-566.237.5421. Overdose can cause nausea, vomiting, stomach pain, diarrhea, skin rash, drowsiness, hyperactivity, and decreased urination.   What should I avoid while taking amoxicillin and clavulanate potassium? Avoid taking this medicine together with or just after eating a high-fat meal. This will make it harder for your body to absorb the medication. Antibiotic medicines can cause diarrhea, which may be a sign of a new infection. If you have diarrhea that is watery or bloody, call your doctor before using anti-diarrhea medicine. What are the possible side effects of amoxicillin and clavulanate potassium? Get emergency medical help if you have signs of an allergic reaction (hives, difficult breathing, swelling in your face or throat) or a severe skin reaction (fever, sore throat, burning eyes, skin pain, red or purple skin rash with blistering and peeling). Call your doctor at once if you have:  · severe stomach pain, diarrhea that is watery or bloody (even if it occurs months after your last dose);  · pale or yellowed skin, dark colored urine, fever, confusion or weakness;  · loss of appetite, upper stomach pain;  · little or no urination; or  · easy bruising or bleeding. Common side effects may include:  · nausea, vomiting; diarrhea;  · rash, itching;  · vaginal itching or discharge; or  · diaper rash. This is not a complete list of side effects and others may occur. Call your doctor for medical advice about side effects. You may report side effects to FDA at 9-907-FDA-8188. What other drugs will affect amoxicillin and clavulanate potassium? Tell your doctor about all your other medicines, especially:  · allopurinol;  · probenecid; or  · a blood thinner --warfarin, Coumadin, Jantoven. This list is not complete. Other drugs may affect amoxicillin and clavulanate potassium, including prescription and over-the-counter medicines, vitamins, and herbal products. Not all possible drug interactions are listed here. Where can I get more information? Your pharmacist can provide more information about amoxicillin and clavulanate potassium.   Remember, keep this and all other medicines out of

## 2025-05-10 ENCOUNTER — HOSPITAL ENCOUNTER (INPATIENT)
Age: 40
LOS: 3 days | Discharge: HOME | DRG: 751 | End: 2025-05-13
Attending: EMERGENCY MEDICINE | Admitting: PSYCHIATRY & NEUROLOGY
Payer: COMMERCIAL

## 2025-05-10 DIAGNOSIS — F32.A DEPRESSION WITH SUICIDAL IDEATION: Primary | ICD-10-CM

## 2025-05-10 DIAGNOSIS — R45.851 DEPRESSION WITH SUICIDAL IDEATION: Primary | ICD-10-CM

## 2025-05-10 PROBLEM — E87.6 HYPOKALEMIA: Status: ACTIVE | Noted: 2025-05-10

## 2025-05-10 PROBLEM — F25.1 SCHIZOAFFECTIVE DISORDER, DEPRESSIVE TYPE (HCC): Status: ACTIVE | Noted: 2025-05-10

## 2025-05-10 LAB
ALBUMIN SERPL-MCNC: 3.8 G/DL (ref 3.5–5.2)
ALP SERPL-CCNC: 77 U/L (ref 35–104)
ALT SERPL-CCNC: 15 U/L (ref 10–35)
ANION GAP SERPL CALCULATED.3IONS-SCNC: 12 MMOL/L (ref 9–16)
APAP SERPL-MCNC: <5 UG/ML (ref 10–30)
AST SERPL-CCNC: 22 U/L (ref 10–35)
BASOPHILS # BLD: 0.05 K/UL (ref 0–0.2)
BASOPHILS NFR BLD: 1 % (ref 0–2)
BILIRUB SERPL-MCNC: 0.2 MG/DL (ref 0–1.2)
BUN SERPL-MCNC: 6 MG/DL (ref 6–20)
CALCIUM SERPL-MCNC: 8.8 MG/DL (ref 8.6–10.4)
CHLORIDE SERPL-SCNC: 106 MMOL/L (ref 98–107)
CO2 SERPL-SCNC: 21 MMOL/L (ref 20–31)
CREAT SERPL-MCNC: 0.7 MG/DL (ref 0.7–1.2)
EOSINOPHIL # BLD: 0.17 K/UL (ref 0–0.44)
EOSINOPHILS RELATIVE PERCENT: 2 % (ref 0–4)
ERYTHROCYTE [DISTWIDTH] IN BLOOD BY AUTOMATED COUNT: 14.1 % (ref 11.5–14.9)
ETHANOL PERCENT: 0 %
ETHANOLAMINE SERPL-MCNC: <10 MG/DL (ref 0–0.08)
GFR, ESTIMATED: >90 ML/MIN/1.73M2
GLUCOSE SERPL-MCNC: 103 MG/DL (ref 74–99)
HCT VFR BLD AUTO: 38 % (ref 36–46)
HGB BLD-MCNC: 12.1 G/DL (ref 12–16)
IMM GRANULOCYTES # BLD AUTO: <0.03 K/UL (ref 0–0.3)
IMM GRANULOCYTES NFR BLD: 0 %
LYMPHOCYTES NFR BLD: 2.53 K/UL (ref 1.1–3.7)
LYMPHOCYTES RELATIVE PERCENT: 32 % (ref 24–44)
MAGNESIUM SERPL-MCNC: 1.8 MG/DL (ref 1.6–2.6)
MCH RBC QN AUTO: 25.7 PG (ref 26–34)
MCHC RBC AUTO-ENTMCNC: 31.8 G/DL (ref 31–37)
MCV RBC AUTO: 80.7 FL (ref 80–100)
MONOCYTES NFR BLD: 0.72 K/UL (ref 0.1–1.2)
MONOCYTES NFR BLD: 9 % (ref 3–12)
NEUTROPHILS NFR BLD: 56 % (ref 36–66)
NEUTS SEG NFR BLD: 4.36 K/UL (ref 1.5–8.1)
NRBC BLD-RTO: 0 PER 100 WBC
PLATELET # BLD AUTO: 337 K/UL (ref 150–450)
PMV BLD AUTO: 9.7 FL (ref 8–13.5)
POTASSIUM SERPL-SCNC: 3.4 MMOL/L (ref 3.7–5.3)
PROT SERPL-MCNC: 6.6 G/DL (ref 6.6–8.7)
RBC # BLD AUTO: 4.71 M/UL (ref 3.95–5.11)
SALICYLATES SERPL-MCNC: <1 MG/DL (ref 0–10)
SODIUM SERPL-SCNC: 139 MMOL/L (ref 136–145)
WBC OTHER # BLD: 7.9 K/UL (ref 3.5–11)

## 2025-05-10 PROCEDURE — 80179 DRUG ASSAY SALICYLATE: CPT

## 2025-05-10 PROCEDURE — GZHZZZZ GROUP PSYCHOTHERAPY: ICD-10-PCS | Performed by: PSYCHIATRY & NEUROLOGY

## 2025-05-10 PROCEDURE — 1240000000 HC EMOTIONAL WELLNESS R&B

## 2025-05-10 PROCEDURE — APPSS60 APP SPLIT SHARED TIME 46-60 MINUTES: Performed by: NURSE PRACTITIONER

## 2025-05-10 PROCEDURE — 80143 DRUG ASSAY ACETAMINOPHEN: CPT

## 2025-05-10 PROCEDURE — 93005 ELECTROCARDIOGRAM TRACING: CPT | Performed by: PSYCHIATRY & NEUROLOGY

## 2025-05-10 PROCEDURE — 85025 COMPLETE CBC W/AUTO DIFF WBC: CPT

## 2025-05-10 PROCEDURE — 36415 COLL VENOUS BLD VENIPUNCTURE: CPT

## 2025-05-10 PROCEDURE — 6370000000 HC RX 637 (ALT 250 FOR IP): Performed by: PSYCHIATRY & NEUROLOGY

## 2025-05-10 PROCEDURE — G0480 DRUG TEST DEF 1-7 CLASSES: HCPCS

## 2025-05-10 PROCEDURE — 99285 EMERGENCY DEPT VISIT HI MDM: CPT

## 2025-05-10 PROCEDURE — 83735 ASSAY OF MAGNESIUM: CPT

## 2025-05-10 PROCEDURE — 6370000000 HC RX 637 (ALT 250 FOR IP): Performed by: EMERGENCY MEDICINE

## 2025-05-10 PROCEDURE — 80053 COMPREHEN METABOLIC PANEL: CPT

## 2025-05-10 PROCEDURE — 99222 1ST HOSP IP/OBS MODERATE 55: CPT | Performed by: INTERNAL MEDICINE

## 2025-05-10 RX ORDER — RISPERIDONE 0.5 MG/1
0.5 TABLET ORAL 2 TIMES DAILY
Status: DISCONTINUED | OUTPATIENT
Start: 2025-05-10 | End: 2025-05-11

## 2025-05-10 RX ORDER — ACETAMINOPHEN 325 MG/1
650 TABLET ORAL EVERY 4 HOURS PRN
Status: DISCONTINUED | OUTPATIENT
Start: 2025-05-10 | End: 2025-05-13 | Stop reason: HOSPADM

## 2025-05-10 RX ORDER — SERTRALINE HYDROCHLORIDE 25 MG/1
25 TABLET, FILM COATED ORAL DAILY
Status: DISCONTINUED | OUTPATIENT
Start: 2025-05-10 | End: 2025-05-10

## 2025-05-10 RX ORDER — POLYETHYLENE GLYCOL 3350 17 G
2 POWDER IN PACKET (EA) ORAL
Status: DISCONTINUED | OUTPATIENT
Start: 2025-05-10 | End: 2025-05-10

## 2025-05-10 RX ORDER — POLYETHYLENE GLYCOL 3350 17 G/17G
17 POWDER, FOR SOLUTION ORAL DAILY PRN
Status: DISCONTINUED | OUTPATIENT
Start: 2025-05-10 | End: 2025-05-13 | Stop reason: HOSPADM

## 2025-05-10 RX ORDER — MAGNESIUM HYDROXIDE/ALUMINUM HYDROXICE/SIMETHICONE 120; 1200; 1200 MG/30ML; MG/30ML; MG/30ML
30 SUSPENSION ORAL EVERY 6 HOURS PRN
Status: DISCONTINUED | OUTPATIENT
Start: 2025-05-10 | End: 2025-05-13 | Stop reason: HOSPADM

## 2025-05-10 RX ORDER — HYDROXYZINE HYDROCHLORIDE 50 MG/1
50 TABLET, FILM COATED ORAL 3 TIMES DAILY PRN
Status: DISCONTINUED | OUTPATIENT
Start: 2025-05-10 | End: 2025-05-10

## 2025-05-10 RX ORDER — LORAZEPAM 1 MG/1
1 TABLET ORAL ONCE
Status: COMPLETED | OUTPATIENT
Start: 2025-05-10 | End: 2025-05-10

## 2025-05-10 RX ORDER — HYDROXYZINE HYDROCHLORIDE 25 MG/1
25 TABLET, FILM COATED ORAL 3 TIMES DAILY PRN
Status: DISCONTINUED | OUTPATIENT
Start: 2025-05-10 | End: 2025-05-11

## 2025-05-10 RX ORDER — IBUPROFEN 400 MG/1
400 TABLET, FILM COATED ORAL EVERY 6 HOURS PRN
Status: DISCONTINUED | OUTPATIENT
Start: 2025-05-10 | End: 2025-05-13 | Stop reason: HOSPADM

## 2025-05-10 RX ORDER — TRAZODONE HYDROCHLORIDE 50 MG/1
50 TABLET ORAL NIGHTLY PRN
Status: DISCONTINUED | OUTPATIENT
Start: 2025-05-10 | End: 2025-05-13 | Stop reason: HOSPADM

## 2025-05-10 RX ADMIN — HYDROXYZINE HYDROCHLORIDE 50 MG: 50 TABLET, FILM COATED ORAL at 08:57

## 2025-05-10 RX ADMIN — LORAZEPAM 1 MG: 1 TABLET ORAL at 01:32

## 2025-05-10 RX ADMIN — POTASSIUM BICARBONATE 40 MEQ: 782 TABLET, EFFERVESCENT ORAL at 02:30

## 2025-05-10 RX ADMIN — RISPERIDONE 0.5 MG: 0.5 TABLET, FILM COATED ORAL at 20:32

## 2025-05-10 ASSESSMENT — PATIENT HEALTH QUESTIONNAIRE - PHQ9
SUM OF ALL RESPONSES TO PHQ QUESTIONS 1-9: 2
SUM OF ALL RESPONSES TO PHQ QUESTIONS 1-9: 2
2. FEELING DOWN, DEPRESSED OR HOPELESS: SEVERAL DAYS
1. LITTLE INTEREST OR PLEASURE IN DOING THINGS: SEVERAL DAYS
SUM OF ALL RESPONSES TO PHQ QUESTIONS 1-9: 2
SUM OF ALL RESPONSES TO PHQ QUESTIONS 1-9: 2

## 2025-05-10 ASSESSMENT — SLEEP AND FATIGUE QUESTIONNAIRES
DO YOU HAVE DIFFICULTY SLEEPING: YES
SLEEP PATTERN: DIFFICULTY FALLING ASLEEP;DISTURBED/INTERRUPTED SLEEP
DO YOU USE A SLEEP AID: YES
AVERAGE NUMBER OF SLEEP HOURS: 5

## 2025-05-10 ASSESSMENT — PAIN - FUNCTIONAL ASSESSMENT: PAIN_FUNCTIONAL_ASSESSMENT: NONE - DENIES PAIN

## 2025-05-10 ASSESSMENT — LIFESTYLE VARIABLES
HOW OFTEN DO YOU HAVE A DRINK CONTAINING ALCOHOL: NEVER
HOW OFTEN DO YOU HAVE A DRINK CONTAINING ALCOHOL: NEVER
HOW MANY STANDARD DRINKS CONTAINING ALCOHOL DO YOU HAVE ON A TYPICAL DAY: PATIENT DOES NOT DRINK
HOW OFTEN DO YOU HAVE A DRINK CONTAINING ALCOHOL: NEVER
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
## 2025-05-10 NOTE — PLAN OF CARE
Problem: Psychosis  Goal: Will report no hallucinations or delusions  Description: INTERVENTIONS:1. Administer medication as  ordered2. Assist with reality testing to support increasing orientation3. Assess if patient's hallucinations or delusions are encouraging self harm or harm to others and intervene as appropriate  Outcome: Progressing  Note: Pt denies having hallucinations and has not been seen responding to internal stimulus. Pt denies having depression or anxiety. Pt denies having suicidal and homicidal ideations. Pt has been out and social with select peers.      Problem: Depression  Goal: Will be euthymic at discharge  Description: INTERVENTIONS:1. Administer medication as ordered2. Provide emotional support via 1:1 interaction with staff3. Encourage involvement in milieu/groups/activities4. Monitor for social isolation  Outcome: Progressing

## 2025-05-10 NOTE — GROUP NOTE
Group Therapy Note    Date: 5/10/2025    Group Start Time: 1000  Group End Time: 1030  Group Topic: Psychotherapy    [facility]    Ciara Moreira MSW, SYLVIA        Group Therapy Note    Attendees: 7/12       Patient was offered group therapy today but declined to participate despite encouragement from staff.  1:1 was offered.       Signature:  RADHA Lauren, SYLVIA

## 2025-05-10 NOTE — ED PROVIDER NOTES
EMERGENCY DEPARTMENT ENCOUNTER    Pt Name: Gia Donahue  MRN: [id number]  Birthdate 1985  Date of evaluation: 5/10/25  CHIEF COMPLAINT       Chief Complaint   Patient presents with    Mental Health Problem     HISTORY OF PRESENT ILLNESS   40-year-old female presents for mental health evaluation.  States that she did not offer her meds, reportedly hearing voices telling her to kill herself, she states that she is concerned that she will get to that point has attempted to overdose in the past.  She denies any medication ingestion today.  Denies homicidal ideation denies visual hallucinations denies any recent drug or alcohol use denies any current medical complaints    The history is provided by the patient.           REVIEW OF SYSTEMS     Review of Systems   Constitutional:  Negative for fever.   HENT:  Negative for congestion and ear pain.    Eyes:  Negative for pain.   Respiratory:  Negative for shortness of breath.    Cardiovascular:  Negative for chest pain, palpitations and leg swelling.   Gastrointestinal:  Negative for abdominal pain.   Genitourinary:  Negative for dysuria and flank pain.   Musculoskeletal:  Negative for back pain.   Skin:  Negative for color change.   Neurological:  Negative for numbness and headaches.   Psychiatric/Behavioral:  Positive for hallucinations and suicidal ideas. Negative for confusion.    All other systems reviewed and are negative.    PASTMEDICAL HISTORY     Past Medical History:   Diagnosis Date    Abnormal Pap smear of cervix      Past Problem List  Patient Active Problem List   Diagnosis Code    Quit smoking within past year Z87.891    Depression with suicidal ideation F32.A, R45.851     SURGICAL HISTORY     No past surgical history on file.  CURRENT MEDICATIONS       Previous Medications    LURASIDONE HCL (LATUDA PO)    Take by mouth    NORETHINDRONE (AYGESTIN) 5 MG TABLET    Take 1 tablet by mouth 3 times daily for 1 day, THEN 1 tablet 2 times daily for 2 days, THEN 1

## 2025-05-10 NOTE — BH NOTE
[facility]  Admission Note     Admission Type:   Voluntary     Reason for admission:  Reason for Admission: Patient states she is hearing voices telling her to harm herself      Addictive Behavior:   Addictive Behavior  In the Past 3 Months, Have You Felt or Has Someone Told You That You Have a Problem With  : None    Medical Problems:   Past Medical History:   Diagnosis Date    Abnormal Pap smear of cervix        Status EXAM:  Mental Status and Behavioral Exam  Normal: No  Level of Assistance: Independent/Self  Facial Expression: Flat  Affect: Appropriate  Level of Consciousness: Alert  Frequency of Checks: 4 times per hour, close  Mood:Normal: No  Mood: Anxious, Depressed, Helpless, Sad  Motor Activity:Normal: Yes  Eye Contact: Good  Observed Behavior: Cooperative, Preoccupied, Friendly  Sexual Misconduct History: Current - no  Preception: [place] to person, [place] to time, [place] to place, [place] to situation  Attention:Normal: No  Attention: Distractible  Thought Processes: Circumstantial  Thought Content:Normal: No  Thought Content: Preoccupations  Depression Symptoms: Impaired concentration, Feelings of helplessness, Sleep disturbance  Anxiety Symptoms: Generalized  Emilie Symptoms: No problems reported or observed.  Hallucinations: Auditory (comment) (reports hearing voices in ED, but denied upon admission to [facility])  Delusions: No  Memory:Normal: Yes  Insight and Judgment: No  Insight and Judgment: Poor insight, Poor judgment    Tobacco Screening:  Practical Counseling, on admission, juliane X, if applicable and completed (first 3 are required if patient doesn't refuse):            ( ) Recognizing danger situations (included triggers and roadblocks)                    ( ) Coping skills (new ways to manage stress,relaxation techniques, changing routine, distraction)                                                           ( ) Basic information about quitting (benefits of quitting, techniques in how

## 2025-05-10 NOTE — H&P
Department of Psychiatry  Attending Physician Psychiatric Assessment     Patient: Gia Donahue  MRN: [id number]  Reason for Admission to Psychiatric Unit:  Threat to self requiring 24 hour professional observation  Command hallucinations directing harm to self or others; risk of the patient taking action  Concerns about patient's safety in the community  Past Mental Health Diagnosis: Unknown  Triggering event or precipitating factor: Relationship Issues, chronic mental health issues  Length of time/duration of triggering event: Weeks  Legal Status: Voluntary    CHIEF COMPLAINT: Suicidal ideation with command auditory hallucinations    History obtained from: Patient, electronic medical record          HISTORY OF PRESENT ILLNESS:    Gia Donahue is a 40 y.o. female who has a past medical history of mental health disorder, unknown diagnosis. Patient presented to the ED endorsing suicidal ideation and expressing belief that she would overdose on medications.  She had reported that she did attempt to end her life by overdose 4 months ago in a similar mental state.  She was admitted to [facility] at that time.  Patient believes that she needs help and is unable to contract for safety.  Admitted to [facility] on a voluntary basis.    On assessment, patient is sitting in bed.  She is awake and alert.  Thought process linear and goal-directed.  We discussed events leading to hospitalization.  Patient reports that she has not been sleeping more than 2 hours per night for the last few weeks that she is experiencing distressing auditory hallucinations telling her that she is unsafe and that she will die when she sleeps.  She also states that the voices tell her to harm herself.  She will intentionally drink coffee to keep herself awake, but desperately wants to sleep.  She notes that other parts of her daily life have been falling apart.  She has stopped working.  Reports problems in her relationship with her children's

## 2025-05-10 NOTE — GROUP NOTE
Group Therapy Note    Date: 5/10/2025    Group Start Time: 0900  Group End Time: 0925  Group Topic: Nursing    Kyra العراقي RN        Group Therapy Note    Attendees: 5/13       Patient's Goal:  \"get number out of phone\"      Status After Intervention:  Unchanged    Participation Level: Active Listener and Interactive    Participation Quality: Appropriate      Speech:  normal      Thought Process/Content: Logical      Affective Functioning: Congruent      Mood: euthymic      Level of consciousness:  Alert and Oriented x4      Response to Learning: Progressing to goal      Endings: None Reported    Modes of Intervention: Exploration      Discipline Responsible: Registered Nurse      Signature:  Kyra Mayorga RN

## 2025-05-11 LAB
CHOLEST SERPL-MCNC: 172 MG/DL (ref 0–199)
CHOLESTEROL/HDL RATIO: 2.6
EST. AVERAGE GLUCOSE BLD GHB EST-MCNC: 108 MG/DL
HBA1C MFR BLD: 5.4 % (ref 4–6)
HDLC SERPL-MCNC: 65 MG/DL
LDLC SERPL CALC-MCNC: 98 MG/DL (ref 0–100)
TRIGL SERPL-MCNC: 43 MG/DL (ref 0–149)

## 2025-05-11 PROCEDURE — 36415 COLL VENOUS BLD VENIPUNCTURE: CPT

## 2025-05-11 PROCEDURE — 6370000000 HC RX 637 (ALT 250 FOR IP): Performed by: PSYCHIATRY & NEUROLOGY

## 2025-05-11 PROCEDURE — 80061 LIPID PANEL: CPT

## 2025-05-11 PROCEDURE — 83036 HEMOGLOBIN GLYCOSYLATED A1C: CPT

## 2025-05-11 PROCEDURE — 1240000000 HC EMOTIONAL WELLNESS R&B

## 2025-05-11 RX ORDER — HYDROXYZINE HYDROCHLORIDE 10 MG/1
10 TABLET, FILM COATED ORAL 3 TIMES DAILY PRN
Status: DISCONTINUED | OUTPATIENT
Start: 2025-05-11 | End: 2025-05-13 | Stop reason: HOSPADM

## 2025-05-11 RX ORDER — RISPERIDONE 1 MG/1
1 TABLET ORAL 2 TIMES DAILY
Status: DISCONTINUED | OUTPATIENT
Start: 2025-05-11 | End: 2025-05-13 | Stop reason: HOSPADM

## 2025-05-11 RX ADMIN — RISPERIDONE 0.5 MG: 0.5 TABLET, FILM COATED ORAL at 08:05

## 2025-05-11 ASSESSMENT — LIFESTYLE VARIABLES
HOW OFTEN DO YOU HAVE A DRINK CONTAINING ALCOHOL: NEVER
HOW MANY STANDARD DRINKS CONTAINING ALCOHOL DO YOU HAVE ON A TYPICAL DAY: PATIENT DOES NOT DRINK

## 2025-05-11 NOTE — PLAN OF CARE
Problem: Depression  Goal: Will be euthymic at discharge  Description: INTERVENTIONS:1. Administer medication as ordered2. Provide emotional support via 1:1 interaction with staff3. Encourage involvement in milieu/groups/activities4. Monitor for social isolation  5/10/2025 2141 by Garland Villasenor LPN  Outcome: Progressing     Problem: Psychosis  Goal: Will report no hallucinations or delusions  Description: INTERVENTIONS:1. Administer medication as  ordered2. Assist with reality testing to support increasing orientation3. Assess if patient's hallucinations or delusions are encouraging self harm or harm to others and intervene as appropriate  5/10/2025 2141 by Garland Villasenor LPN  Outcome: Progressing     Problem: Self Harm/Suicidality  Goal: Will have no self-injury during hospital stay  Description: INTERVENTIONS:1.  Ensure constant observer at bedside with Q15M safety checks2.  Maintain a safe environment3.  Secure patient belongings4.  Ensure family/visitors adhere to safety recommendations5.  Ensure safety tray has been added to patient's diet order6.  Every shift and PRN: Re-assess suicidal risk via Frequent Screener  Outcome: Progressing  Patient stated mood is \"bored, drowsy\". Patient denies suicidal and homicidal ideations. Patient denies visual and auditory hallucinations stated \"I have heard voices telling me to kill myself but not today\". Patient denies feeling depressed but stated anxiety 6/10. Writer offered PRN anxiety medication and 1:1 time patient declined medication stated \" I don't like the way it made me feel earlier\", told patient dose was lowered patient still declined but accepted 1:1 time. Patient stated appetite is \"okay\" and sleep is \"getting better\". Patient cooperative with assessments and medication administration will continue to monitor every 15 minutes with environmental safety checks.

## 2025-05-11 NOTE — H&P
[facility]  Wagner Gonzalez MD; Galdino Aceves MD,, Kait Johnson MD, Dr Nhan Adams MD   ; Nasrin Chen MD    [facility] Internal Medicine   IN-PATIENT SERVICE   [facility]     HISTORY AND PHYSICAL EXAMINATION            Date:   5/10/2025  Patient name:  Gia Donahue  Date of admission:  5/10/2025 12:34 AM  MRN:   [id number]  Account:  [id number]  YOB: 1985  PCP:    Shyla Bhatt APRN - CNP  Room:   [facility]0134-  Code Status:    Full Code      Chief Complaint:     Suicidal /Ac Psychosis    History Obtained From:     Patient/EMR/bedside RN     History of Present Illness:     40 F with hx of anxiety and depression admitted to inpt psych with ac psychosis    Past Medical History:     Past Medical History:   Diagnosis Date    Abnormal Pap smear of cervix         Past Surgical History:     No past surgical history on file.     Medications Prior to Admission:     Prior to Admission medications    Medication Sig Start Date End Date Taking? Authorizing Provider   norethindrone (AYGESTIN) 5 MG tablet Take 1 tablet by mouth 3 times daily for 1 day, THEN 1 tablet 2 times daily for 2 days, THEN 1 tablet daily for 4 days. 2/12/21 2/19/21  Gilda Stockton MD   Lurasidone HCl (LATUDA PO) Take by mouth  Patient not taking: Reported on 12/23/2021    ProviderWaqas MD        Allergies:     Peanut-containing drug products    Social History:     Tobacco:    reports that she has quit smoking. She has never used smokeless tobacco.  Alcohol:      reports that she does not currently use alcohol.  Drug Use:  reports no history of drug use.    Family History:     No family history on file.    Review of Systems:     Positive and Negative as described in HPI.    CONSTITUTIONAL:  negative for fevers, chills, sweats, fatigue, weight loss  HEENT:  negative for vision, hearing changes, runny nose, throat pain  RESPIRATORY:  negative for shortness of

## 2025-05-11 NOTE — PLAN OF CARE
Problem: Depression  Goal: Will be euthymic at discharge  Description: INTERVENTIONS:1. Administer medication as ordered2. Provide emotional support via 1:1 interaction with staff3. Encourage involvement in milieu/groups/activities4. Monitor for social isolation  5/11/2025 0939 by Kristine Balbuena RN  Outcome: Progressing  Note: Patient denied depression at time of assessment. Selectively social with peers and social with staff. Remains medication compliant and is calm and cooperative with assessment.   5/10/2025 2141 by Garland Villasenor LPN  Outcome: Progressing     Problem: Psychosis  Goal: Will report no hallucinations or delusions  Description: INTERVENTIONS:1. Administer medication as  ordered2. Assist with reality testing to support increasing orientation3. Assess if patient's hallucinations or delusions are encouraging self harm or harm to others and intervene as appropriate  5/11/2025 0939 by Kristine Balbuena RN  Outcome: Progressing  Note: Patient denied hallucinations and delusions at time of assessment. Patient has not been observed responding to internal stimuli. Remains medication compliant and is calm and cooperative with assessments.   5/10/2025 2141 by Garland Villasenor LPN  Outcome: Progressing     Problem: Self Harm/Suicidality  Goal: Will have no self-injury during hospital stay  Description: INTERVENTIONS:1.  Ensure constant observer at bedside with Q15M safety checks2.  Maintain a safe environment3.  Secure patient belongings4.  Ensure family/visitors adhere to safety recommendations5.  Ensure safety tray has been added to patient's diet order6.  Every shift and PRN: Re-assess suicidal risk via Frequent Screener  5/11/2025 0939 by Kirstine Balbuena RN  Outcome: Progressing  Note: Patient denied suicidal and homicidal ideations at time of assessment. Endorses safety while on the unit. Remains medication compliant and has been selectively social with peers.   5/10/2025 2141 by [place]

## 2025-05-12 VITALS
TEMPERATURE: 98 F | OXYGEN SATURATION: 100 % | RESPIRATION RATE: 16 BRPM | HEIGHT: 63 IN | HEART RATE: 80 BPM | SYSTOLIC BLOOD PRESSURE: 105 MMHG | WEIGHT: 166 LBS | DIASTOLIC BLOOD PRESSURE: 77 MMHG | BODY MASS INDEX: 29.41 KG/M2

## 2025-05-12 LAB
EKG ATRIAL RATE: 88 BPM
EKG P AXIS: 55 DEGREES
EKG P-R INTERVAL: 152 MS
EKG Q-T INTERVAL: 362 MS
EKG QRS DURATION: 80 MS
EKG QTC CALCULATION (BAZETT): 438 MS
EKG R AXIS: 43 DEGREES
EKG T AXIS: 7 DEGREES
EKG VENTRICULAR RATE: 88 BPM

## 2025-05-12 PROCEDURE — 1240000000 HC EMOTIONAL WELLNESS R&B

## 2025-05-12 PROCEDURE — 6370000000 HC RX 637 (ALT 250 FOR IP): Performed by: PSYCHIATRY & NEUROLOGY

## 2025-05-12 PROCEDURE — 99232 SBSQ HOSP IP/OBS MODERATE 35: CPT | Performed by: PSYCHIATRY & NEUROLOGY

## 2025-05-12 PROCEDURE — 93010 ELECTROCARDIOGRAM REPORT: CPT | Performed by: INTERNAL MEDICINE

## 2025-05-12 PROCEDURE — 90833 PSYTX W PT W E/M 30 MIN: CPT | Performed by: PSYCHIATRY & NEUROLOGY

## 2025-05-12 PROCEDURE — APPSS30 APP SPLIT SHARED TIME 16-30 MINUTES

## 2025-05-12 RX ADMIN — RISPERIDONE 1 MG: 1 TABLET, FILM COATED ORAL at 09:28

## 2025-05-12 RX ADMIN — RISPERIDONE 1 MG: 1 TABLET, FILM COATED ORAL at 20:39

## 2025-05-12 NOTE — GROUP NOTE
Group Therapy Note    Date: 5/12/2025    Group Start Time: 0900  Group End Time: 0930  Group Topic: Community Meeting    [facility]    Yaya Ivory RN        Group Therapy Note    Attendees: 7/14       Patient was offered group therapy today but declined to participate despite encouragement from staff.  1:1 was offered.       Signature:  Yaya Ivory RN

## 2025-05-12 NOTE — GROUP NOTE
Group Therapy Note    Date: 5/12/2025    Group Start Time: 1100  Group End Time: 1145  Group Topic: Cognitive Skills    [facility]        Group Therapy Note    Attendees: 7/14    Cognitive Skills Group Note        Date: May 12, 2025       Start Time: 11am  End Time: 11:45am      Number of Participants in Group & Unit Census:  7/14    Topic:  interpersonal skills, memory recall, self-expression     Goal of Group: To improve interpersonal skills and memory recall through collaborating with peers and demonstrating self-expression.      Comments:     Patient did not participate in Cognitive Skills group, despite staff encouragement and explanation of benefits.  Patient remain seclusive to self.  Q15 minute safety checks maintained for patient safety and will continue to encourage patient to attend unit programming.        Signature:  BRADEN Guadarrama

## 2025-05-12 NOTE — GROUP NOTE
Group Therapy Note    Date: 5/12/2025    Group Start Time: 1330  Group End Time: 1400  Group Topic: Community Meeting    [facility]    Yaya Ivory RN        Group Therapy Note    Attendees: 8/12       Patient was offered group therapy today but declined to participate despite encouragement from staff.  1:1 was offered.           Signature:  Yaya Ivory RN

## 2025-05-12 NOTE — GROUP NOTE
Wrap-Up Group Note        Date: May 11, 2025 Start Time: 2030  End Time: 2200      Number of Participants in Group & Unit Census:  6/13    Topic: wrap-up    Goal of Group: discuss the day's goals, tomorrow's goals, goals upon discharge, and relaxation and social group card game       Comments:     Patient did not participate in Wrap-Up group, despite staff encouragement and explanation of benefits. Q15 minute safety checks maintained for patient safety and will continue to encourage patient to attend unit programming.

## 2025-05-12 NOTE — BH NOTE
Patient didn't participate in the orientation group despite staff Invite to attend. Patient preferred to sleep.

## 2025-05-12 NOTE — PLAN OF CARE
[facility]  Day 3 Interdisciplinary Treatment Plan NOTE    Review Date & Time: 5/12/2025   1245    Admission Type:   Admission Type: Voluntary    Reason for admission:  Reason for Admission: Patient states she is hearing voices telling her to harm herself  Estimated Length of Stay: 5-7 days  Estimated Discharge Date Update: to be determined by physician    PATIENT STRENGTHS:  Patient Strengths    Patient Strengths and Limitations:Limitations: Difficulty problem solving/relies on others to help solve problems, Multiple barriers to leisure interests, Lacks leisure interests, External locus of control  Addictive Behavior:Addictive Behavior  In the Past 3 Months, Have You Felt or Has Someone Told You That You Have a Problem With  : None  Medical Problems:  Past Medical History:   Diagnosis Date    Abnormal Pap smear of cervix        Risk:  Fall Risk   Yash Scale Yash Scale Score: 22  BVC    Change in scores no Changes to plan of Care no    Status EXAM:   Mental Status and Behavioral Exam  Normal: No  Level of Assistance: Independent/Self  Facial Expression: Flat  Affect: Appropriate  Level of Consciousness: Alert  Frequency of Checks: 4 times per hour, close  Mood:Normal: No  Mood: Anxious  Motor Activity:Normal: Yes  Eye Contact: Good  Observed Behavior: Cooperative, Friendly  Sexual Misconduct History: Current - no  Preception: [place] to person, [place] to time, [place] to place, [place] to situation  Attention:Normal: Yes  Attention: Distractible  Thought Processes: Unremarkable  Thought Content:Normal: No  Thought Content: Preoccupations  Depression Symptoms: Isolative, Feelings of hopelessess, Feelings of helplessness  Anxiety Symptoms: Generalized  Emilie Symptoms: No problems reported or observed.  Hallucinations: None  Delusions: No  Memory:Normal: Yes  Insight and Judgment: No  Insight and Judgment: Poor judgment, Poor insight    Daily Assessment Last Entry:   Daily Sleep (WDL): Within Defined

## 2025-05-12 NOTE — PLAN OF CARE
Problem: Psychosis  Goal: Will report no hallucinations or delusions  Description: INTERVENTIONS:1. Administer medication as  ordered2. Assist with reality testing to support increasing orientation3. Assess if patient's hallucinations or delusions are encouraging self harm or harm to others and intervene as appropriate  5/12/2025 1154 by Raúl Esposito LPN  Outcome: Progressing     Problem: Self Harm/Suicidality  Goal: Will have no self-injury during hospital stay  Description: INTERVENTIONS:1.  Ensure constant observer at bedside with Q15M safety checks2.  Maintain a safe environment3.  Secure patient belongings4.  Ensure family/visitors adhere to safety recommendations5.  Ensure safety tray has been added to patient's diet order6.  Every shift and PRN: Re-assess suicidal risk via Frequent Screener  5/12/2025 1154 by Raúl Esposito LPN  Outcome: Progressing   Patient denies intent to harm self, remains free from self harm. Support and encouragement provided

## 2025-05-12 NOTE — PLAN OF CARE
Problem: Psychosis  Goal: Will report no hallucinations or delusions  Description: INTERVENTIONS:1. Administer medication as  ordered2. Assist with reality testing to support increasing orientation3. Assess if patient's hallucinations or delusions are encouraging self harm or harm to others and intervene as appropriate  5/12/2025 0417 by Freda Becker RN  Outcome: Progressing     Problem: Depression  Goal: Will be euthymic at discharge  Description: INTERVENTIONS:1. Administer medication as ordered2. Provide emotional support via 1:1 interaction with staff3. Encourage involvement in milieu/groups/activities4. Monitor for social isolation  Outcome: Progressing    The patient has been isolative to her room. She denies hallucinations or delusions. She denies experiencing any depression or anxiety. She refused her nightly schedule dose of Risperdal 1 mg ordered for twice daily. She reports that she does not need medication. She denies endorsing current thoughts of self harm. Writer encouraged the patient to seek assistance should those thoughts arise. She voiced an understanding. She requests printoffs of word searches to help occur her time. Writer will continue to offer emotional support. Q15 minute checks to continue for safety.

## 2025-05-13 PROBLEM — F32.A DEPRESSION WITH SUICIDAL IDEATION: Status: RESOLVED | Noted: 2025-05-10 | Resolved: 2025-05-13

## 2025-05-13 PROBLEM — R45.851 DEPRESSION WITH SUICIDAL IDEATION: Status: RESOLVED | Noted: 2025-05-10 | Resolved: 2025-05-13

## 2025-05-13 PROCEDURE — 99231 SBSQ HOSP IP/OBS SF/LOW 25: CPT | Performed by: INTERNAL MEDICINE

## 2025-05-13 PROCEDURE — 6370000000 HC RX 637 (ALT 250 FOR IP): Performed by: PSYCHIATRY & NEUROLOGY

## 2025-05-13 RX ORDER — RISPERIDONE 1 MG/1
1 TABLET ORAL 2 TIMES DAILY
Qty: 60 TABLET | Refills: 3 | Status: SHIPPED | OUTPATIENT
Start: 2025-05-13

## 2025-05-13 RX ADMIN — RISPERIDONE 1 MG: 1 TABLET, FILM COATED ORAL at 09:27

## 2025-05-13 NOTE — DISCHARGE SUMMARY
Provider Discharge Summary     Patient ID:  Gia Donahue  [id number]  40 y.o.  1985    Admit date: 5/10/2025    Discharge date and time: 5/13/2025  9:41 AM     Admitting Physician: Arabella Parada MD     Discharge Physician: Arabella Parada MD    Admission Diagnoses: Depression with suicidal ideation [F32.A, R45.851]    Discharge Diagnoses:      Schizoaffective disorder depressive type     Patient Active Problem List   Diagnosis Code    Quit smoking within past year Z87.891    Hypokalemia E87.6        Admission Condition: poor    Discharged Condition: stable    Indication for Admission: threat to self    History of Present Illnes (present tense wording is of findings from admission exam and are not necessarily indicative of current findings):   Gia Donahue is a 40 y.o. female who has a past medical history of mental health disorder, unknown diagnosis. Patient presented to the ED endorsing suicidal ideation and expressing belief that she would overdose on medications.  She had reported that she did attempt to end her life by overdose 4 months ago in a similar mental state.  She was admitted to [facility] at that time.  Patient believes that she needs help and is unable to contract for safety.  Admitted to [facility] on a voluntary basis.     On assessment, patient is sitting in bed.  She is awake and alert.  Thought process linear and goal-directed.  We discussed events leading to hospitalization.  Patient reports that she has not been sleeping more than 2 hours per night for the last few weeks that she is experiencing distressing auditory hallucinations telling her that she is unsafe and that she will die when she sleeps.  She also states that the voices tell her to harm herself.  She will intentionally drink coffee to keep herself awake, but desperately wants to sleep.  She notes that other parts of her daily life have been falling apart.  She has stopped working.  Reports problems in her relationship with

## 2025-05-13 NOTE — GROUP NOTE
Psych-Ed/Relapse Prevention Group Note        Date: May 13, 2025 Start Time: 1:30pm  End Time:  2:15pm      Number of Participants in Group & Unit Census:  4/12    Topic: Socialization    Goal of Group:Patient will demonstrate improved interpersonal skills      Comments:     Patient did not participate in Psych-Ed/Relapse Prevention group, despite staff encouragement and explanation of benefits.  Patient remain seclusive to self.  Q15 minute safety checks maintained for patient safety and will continue to encourage patient to attend unit programming.         Signature:  MEGGAN GrahamS

## 2025-05-13 NOTE — PROGRESS NOTES
Behavioral Services  Medicare Certification Upon Admission    I certify that this patient's inpatient psychiatric hospital admission is medically necessary for:    [x] (1) Treatment which could reasonably be expected to improve this patient's condition,       [x] (2) Or for diagnostic study;     AND     [x](2) The inpatient psychiatric services are provided while the individual is under the care of a physician and are included in the individualized plan of care.    Estimated length of stay/service 5    Plan for post-hospital care home    Electronically signed by Arabella Pardaa MD on 5/10/2025 at 9:40 AM      
  Daily Progress Note  5/12/2025    Patient Name: Gia Donahue    CHIEF COMPLAINT:  Acute psychosis           SUBJECTIVE:    Patient is seen today for a follow up assessment. She has been compliant with scheduled medications and behaviorally in control. She has not required any emergency medications for agitation in the past 24 hours. She was seated in chair upon approach. She was noted to be tearful. She tells writer that she is \"a little upset\" as her son is being \"disrespectful.\" She is unsure of why he is being disrespectful but believes it may be due to his age. She endorses some hopelessness and depression related to situation. She reports improvement in suicidal ideation and denies homicidal ideation. She continues to have auditory hallucinations but reports that the voices are \"muffled.\" She denies visual hallucinations. She denies paranoia. She feels as though the medications are \"working\" and denies side effects. While she is showing modest signs of improvement, she is not yet able to contract for safety in the community and does not demonstrate stability. She required continued inpatient hospitalization for safety and stabilization until appropriate discharge is arranged.     Appetite:  [x] Adequate/Unchanged  [] Increased  [] Decreased      Sleep:       [] Adequate/Unchanged  [x] Fair  [] Poor      Group Attendance on Unit:   [] Yes   [] Selectively    [x] No    Compliant with scheduled medications: [x] Yes  [] No    Received emergency medications in past 24 hrs: [] Yes   [x] No    Medication Side Effects: Denies         Mental Status Exam  Level of consciousness: Alert and awake   Appearance: Appropriate attire for setting, seated in chair, with fair  grooming and hygiene   Behavior/Motor: Approachable, engages with interviewer, tearful  Attitude toward examiner: Cooperative, attentive, good eye contact  Speech: normal rate, normal volume, and well articulated   Mood:  \"A little upset\"  Affect: Mood 
CLINICAL PHARMACY NOTE: MEDS TO BEDS    Total # of Prescriptions Filled: 1   The following medications were delivered to the patient:  Risperidone 1MG Tablets     Additional Documentation:  Delivered to ISAAK Olsen RN at 11:14AM 5/13/25  
Janes unable to complete admission OQ at this time. States \"these medicines got me floating in the gracie.\" Patient given paper copy to complete and states she will return it if completed  
Patient complete paper copy of admission OQ and returned to staff.   
RT ASSESSMENT TREATMENT GOALS    [x]Pt Goal:  Pt will identify 1-2 positive coping skills by time of discharge.    []Pt Goal:  Pt will identify 1-2 positive aspects of self by time of discharge.    []Pt Goal:  Pt will remain on task/topic for 15-30 minutes during group by time of discharge.    []Pt Goal:  Pt will identify 1-2 aspects of relapse prevention plan by time of discharge.    [x]Pt Goal:  Pt will join in conversation with peers 1-2 times per group by time of discharge.    []Pt Goal:  Pt will identify 1-2 new leisure interests by time of discharge.    [x]Pt Goal:  Pt will not voice any delusional content by time of discharge.    
previous visit (from the past 24 hours).      Imaging/Diagonstics:  No results found.    Assessment :      Hospital Problems           Last Modified POA    Hypokalemia 5/10/2025 Yes       Plan:     Admitted to inpatient psych with suicidal ideations    Hypokalemia, will replace   Labs and medications reviewed.     DVT prophylaxis,  Pt mobile   Full code status   No new complaint  We will sign off, please call with questions    Consultations:   IP CONSULT TO INTERNAL MEDICINE      Galdino Aceves MD  5/13/2025  2:24 PM    Copy sent to Shyla Moreno, APRN - CNP    Please note that this chart was generated using voice recognition Dragon dictation software.  Although every effort was made to ensure the accuracy of this automated transcription, some errors in transcription may have occurred.    
patient.   Medications Changed Today.  A discussion of risks, benefits, and alternatives was held with the patient and this provider with regards to medication changes.  After this discussion we mutually agreed to proceed with the medication changes.    Plan to reduce hydroxyzine to 10mg TID as needed and increase in Risperdal to 1mg PO BID    Risks, benefits, side effects, drug-to-drug interactions and alternatives to treatment were discussed. The patient has consented to treatment.   Encourage patient to attend group and other milieu activities.  Discharge planning discussed with the patient and treatment team.    PSYCHOTHERAPY/COUNSELING:  [] Therapeutic interview  [x] Supportive  [] CBT  [] Ongoing  [] Other    [x] Patient continues to need, on a daily basis, active treatment furnished directly by or requiring the supervision of inpatient psychiatric personnel      Anticipated Length of stay:5                                         Gia Donahue is a 40 y.o. female being evaluated face to face.     --Arabella Parada MD on 5/11/2025 at 10:24 AM    An electronic signature was used to authenticate this note.     **This report has been created using voice recognition software. It may contain minor errors which are inherent in voice recognition technology.**

## 2025-05-13 NOTE — TRANSITION OF CARE
Behavioral Health Transition Record    Patient Name: Gia Donahue  YOB: 1985   Medical Record Number: [id number]  Date of Admission: 5/10/2025 12:34 AM   Date of Discharge: 5/13    Attending Provider: Arabella Parada MD   Discharging Provider:   To contact this individual call [phone] and ask the  to page.  If unavailable, ask to be transferred to Behavioral Health Provider on call.  A Behavioral Health Provider will be available on call 24/7 and during holidays.    Primary Care Provider: Shyla Bhatt APRN - CNP    Allergies   Allergen Reactions    Peanut-Containing Drug Products        Reason for Admission:   Patient: Gia Donahue  MRN: [id number]  Reason for Admission to Psychiatric Unit:  Threat to self requiring 24 hour professional observation  Command hallucinations directing harm to self or others; risk of the patient taking action  Concerns about patient's safety in the community  Past Mental Health Diagnosis: Unknown  Triggering event or precipitating factor: Relationship Issues, chronic mental health issues  Length of time/duration of triggering event: Weeks  Legal Status: Voluntary     CHIEF COMPLAINT: Suicidal ideation with command auditory hallucinations    Admission Diagnosis: Depression with suicidal ideation [F32.A, R45.851]    * No surgery found *    Results for orders placed or performed during the hospital encounter of 05/10/25   CBC with Auto Differential   Result Value Ref Range    WBC 7.9 3.5 - 11.0 k/uL    RBC 4.71 3.95 - 5.11 m/uL    Hemoglobin 12.1 12.0 - 16.0 g/dL    Hematocrit 38.0 36.0 - 46.0 %    MCV 80.7 80.0 - 100.0 fL    MCH 25.7 (L) 26.0 - 34.0 pg    MCHC 31.8 31.0 - 37.0 g/dL    RDW 14.1 11.5 - 14.9 %    Platelets 337 150 - 450 k/uL    MPV 9.7 8.0 - 13.5 fL    NRBC Automated 0.0 0 per 100 WBC    Neutrophils % 56 36 - 66 %    Lymphocytes % 32 24 - 44 %    Monocytes % 9 3 - 12 %    Eosinophils % 2 0 - 4 %    Basophils % 1 0 - 2 %    Immature

## 2025-05-13 NOTE — BH NOTE
[facility] [place]  Discharge Note    Pt discharged with followings belongings:   Dental Appliances: None  Vision - Corrective Lenses: None  Hearing Aid: None  Jewelry: Necklace, Earrings, Ring  Body Piercings Removed: N/A  Clothing: Undergarments, Pajamas, Pants, Shirt, Socks, Footwear  Other Valuables: Credit/Debit Card, Money, Purse, Keys ($2.50)   Valuables sent home with patient or returned to patient. Patient educated on aftercare instructions: yes  Information faxed to [facility] by nurse  at 4:30 PM .Patient verbalize understanding of AVS:  yes. Transported to address on file by cab    Status EXAM upon discharge:  Mental Status and Behavioral Exam  Normal: No  Level of Assistance: Independent/Self  Facial Expression: Flat  Affect: Appropriate  Level of Consciousness: Alert  Frequency of Checks: 4 times per hour, close  Mood:Normal: No  Mood: Anxious  Motor Activity:Normal: Yes  Eye Contact: Good  Observed Behavior: Cooperative, Friendly  Sexual Misconduct History: Current - no  Preception: [place] to person, [place] to place, [place] to time, [place] to situation  Attention:Normal: Yes  Attention: Distractible  Thought Processes: Unremarkable  Thought Content:Normal: No  Thought Content: Preoccupations  Depression Symptoms: Isolative  Anxiety Symptoms: Generalized  Emilie Symptoms: No problems reported or observed.  Hallucinations: None  Delusions: No  Memory:Normal: Yes  Insight and Judgment: No  Insight and Judgment: Poor judgment, Poor insight    Tobacco Screening:  Practical Counseling, on admission, juliane X, if applicable and completed (first 3 are required if patient doesn't refuse)r:            ( ) Recognizing danger situations (included triggers and roadblocks)                    ( ) Coping skills (new ways to manage stress,relaxation techniques, changing routine, distraction)                                                           ( ) Basic information about quitting (benefits of quitting,

## 2025-05-13 NOTE — GROUP NOTE
Psych-Ed/Relapse Prevention Group Note        Date: May 13, 2025 Start Time:  10:15am   End Time:  10:50am      Number of Participants in Group & Unit Census:  1/12    Topic: Wellness    Goal of Group:Patient will demonstrate improved interpersonal skills      Comments:     Patient did not participate in Health/Wellness group, despite staff encouragement and explanation of benefits.  Patient remain seclusive to self.  Q15 minute safety checks maintained for patient safety and will continue to encourage patient to attend unit programming.       Signature:  MEGGAN GrahamS

## 2025-05-13 NOTE — DISCHARGE INSTRUCTIONS
Information:  Medications:   Medication summary provided   I understand that I should take only the medications on my list.     -why and when I need to take each medicine.     -which side effects to watch for.     -that I should carry my medication information at all times in case of     Emergency situations.    I will take all of my medicines to follow up appointments.     -check with my physician or pharmacist before taking any new    Medication, over the counter product or drink alcohol.    -Ask about food, drug or dietary supplement interactions.    -discard old lists and update records with medication providers.    Notify Physician:  Notify physician if you notice:   Always call 911 if you feel your life is in danger  In case of an emergency call 911 immediately!  If 911 is not available call your local emergency medical system for help    Behavioral Health Follow Up:  Original Referral Source:ED  Discharge Diagnosis: Depression with suicidal ideation [F32.A, R45.851]  Recommendations for Level of Care: follow up  Patient status at discharge: stable  My hospital  was: [facility]  Aftercare plan faxed: follow up provider   -faxed by: nurse   -date: 5/13   -time: 1400  Prescriptions: filled    Smoking: Quit Smoking.   Call the [employer]'s smoking quitline at 8-530-04E-QUIT  Know the signs of a heart attack   If you have any of the following symptoms call 911 immediately, do not wait more    Than five minutes.    1. Pressure, fullness and/ or squeezing in the center of the chest spreading to    The jaw, neck or shoulder.    2. Chest discomfort with light headedness, fainting, sweating, nausea or    Shortness of breath.   3. Upper abdominal pressure or discomfort.   4. Lower chest pain, back pain, unusual fatigue, shortness of breath, nausea   Or dizziness.     General Information:   Questions regarding your bill: Call HELP program [phone]     Suicide Hotline ([facility] Crisis Care Line)  (042)

## 2025-05-13 NOTE — GROUP NOTE
Psych-Ed/Relapse Prevention Group Note        Date: May 13, 2025 Start Time: 11am  End Time: 11:30am      Number of Participants in Group & Unit Census:  1/12    Topic: Leisure skills    Goal of Group:Patient will identify benefits of leisure for coping and stress management      Comments:     Patient did not participate in Psych-Ed/Relapse Prevention group, despite staff encouragement and explanation of benefits.  Patient remain seclusive to self.  Q15 minute safety checks maintained for patient safety and will continue to encourage patient to attend unit programming.         Signature:  Carmen Houser, CTRS

## 2025-05-13 NOTE — PLAN OF CARE
Problem: Depression  Goal: Will be euthymic at discharge  Description: INTERVENTIONS:1. Administer medication as ordered2. Provide emotional support via 1:1 interaction with staff3. Encourage involvement in milieu/groups/activities4. Monitor for social isolation  5/12/2025 2045 by Lior Tomas RN  Outcome: Progressing  Note: Patient denied feelings of depression. Patient reported feeling slightly anxious 4/10. She was mostly isolative to her room reading books. She was guarded and flat during assessment. Patient ate snack and was medication compliant      Problem: Psychosis  Goal: Will report no hallucinations or delusions  Description: INTERVENTIONS:1. Administer medication as  ordered2. Assist with reality testing to support increasing orientation3. Assess if patient's hallucinations or delusions are encouraging self harm or harm to others and intervene as appropriate  5/12/2025 2045 by Lior Tomas RN  Outcome: Progressing     Problem: Self Harm/Suicidality  Goal: Will have no self-injury during hospital stay  Description: INTERVENTIONS:1.  Ensure constant observer at bedside with Q15M safety checks2.  Maintain a safe environment3.  Secure patient belongings4.  Ensure family/visitors adhere to safety recommendations5.  Ensure safety tray has been added to patient's diet order6.  Every shift and PRN: Re-assess suicidal risk via Frequent Screener  5/12/2025 2045 by Lior Tomas RN  Outcome: Progressing  Note: Patient denied thoughts of hurting herself or others. She remains free from self harm and injury during shift

## 2025-05-13 NOTE — TRANSITION OF CARE
Behavioral Health Transition Record    Patient Name: Gia Donahue  YOB: 1985   Medical Record Number: [id number]  Date of Admission: 5/10/2025 12:34 AM   Date of Discharge: 5/13    Attending Provider: Arabella Parada MD   Discharging Provider: Dr. Parada  To contact this individual call [phone] and ask the  to page.  If unavailable, ask to be transferred to Behavioral Health Provider on call.  A Behavioral Health Provider will be available on call 24/7 and during holidays.    Primary Care Provider: Shyla Bhatt APRN - CNP    Allergies   Allergen Reactions    Peanut-Containing Drug Products        Reason for Admission:   Patient: Gia Donahue  MRN: [id number]  Reason for Admission to Psychiatric Unit:  Threat to self requiring 24 hour professional observation  Command hallucinations directing harm to self or others; risk of the patient taking action  Concerns about patient's safety in the community  Past Mental Health Diagnosis: Unknown  Triggering event or precipitating factor: Relationship Issues, chronic mental health issues  Length of time/duration of triggering event: Weeks  Legal Status: Voluntary     CHIEF COMPLAINT: Suicidal ideation with command auditory hallucinations    Admission Diagnosis: Depression with suicidal ideation [F32.A, R45.851]    * No surgery found *    Results for orders placed or performed during the hospital encounter of 05/10/25   CBC with Auto Differential   Result Value Ref Range    WBC 7.9 3.5 - 11.0 k/uL    RBC 4.71 3.95 - 5.11 m/uL    Hemoglobin 12.1 12.0 - 16.0 g/dL    Hematocrit 38.0 36.0 - 46.0 %    MCV 80.7 80.0 - 100.0 fL    MCH 25.7 (L) 26.0 - 34.0 pg    MCHC 31.8 31.0 - 37.0 g/dL    RDW 14.1 11.5 - 14.9 %    Platelets 337 150 - 450 k/uL    MPV 9.7 8.0 - 13.5 fL    NRBC Automated 0.0 0 per 100 WBC    Neutrophils % 56 36 - 66 %    Lymphocytes % 32 24 - 44 %    Monocytes % 9 3 - 12 %    Eosinophils % 2 0 - 4 %    Basophils % 1 0 - 2 %    Immature

## 2025-05-13 NOTE — BH NOTE
Patient given tobacco quitline number 39275361544 at this time, refusing to call at this time and states they are not interested in quitting.  Will consider options in the future.  Patient given information as to the dangers of long term tobacco use. Continue to reinforce the importance of tobacco cessation.

## 2025-05-13 NOTE — CARE COORDINATION
Discharge Arrangements:      Guardian notified: n/a    Discharge destination/address: [address] [facility] [address]     Transported by: cab      Patient was not accepting of referral.  [facility] were offered to patient throughout admission and at time of discharge. This list of [facility] SAR[facility] providers was provided to patient:     [facility]  [address] [facility] [address]   [address] [facility] [address]  Phone: [phone]     Phone: [phone]    [facility]  [place]   [address] [facility] [address]   [address] [facility] [address]  Phone: [phone]     Phone: [phone]    [facility]  [address] [facility] [address]    [address] [facility] [facility] [address]  Phone: [phone]     Phone: [phone]    [facility]     [facility]   [address] [facility] [address]  [place] [address]      Phone: [phone]  Phone: [phone]        [facility]  [address] [facility] [address]   [address] [place] [address]  Phone: [phone]     Phone:  [phone]    [employer]      [facility]  [address] [facility] [address]   [address] [facility] [address]  Phone: [phone]     Phone: [phone]    [facility]  [address] [facility] [address]   [address] [facility] [address]  Phone: [phone]     Phone: [phone]    [facility]  [facility] for [facility]  [address] [facility] [address] [address] [facility] [address]  Phone: [phone]     Phone: [phone]    [facility]     [facility]  [address] [place] [address] [address] [place] [address] [address] [facility] [address]  Phone:

## 2025-05-13 NOTE — GROUP NOTE
Group Therapy Note    Date: 5/13/2025    Group Start Time: 0900  Group End Time: 0920  Group Topic: Community Meeting    [facility]    Raúl Esposito LPN        Group Therapy Note    Attendees: 3/12       Patient's Goal:  discharge planning    Status After Intervention:  Improved    Participation Level: Interactive    Participation Quality: Appropriate      Speech:  normal      Thought Process/Content: Logical      Affective Functioning: Congruent      Mood: euthymic      Level of consciousness:  Oriented x4      Response to Learning: Able to verbalize current knowledge/experience      Endings: None Reported    Modes of Intervention: Education      Discipline Responsible: Licensed Practical Nurse      Signature:  Raúl Esposito LPN

## 2025-05-14 NOTE — CARE COORDINATION
Name: Gia Donahue    : 1985    Auth number: [id number]     Discharge Date: 25    Destination: home     Discharge Medications:      Medication List        START taking these medications      risperiDONE 1 MG tablet  Commonly known as: RISPERDAL  Take 1 tablet by mouth 2 times daily  Notes to patient: Clear thoughts            CONTINUE taking these medications      norethindrone 5 MG tablet  Commonly known as: Aygestin  Take 1 tablet by mouth 3 times daily for 1 day, THEN 1 tablet 2 times daily for 2 days, THEN 1 tablet daily for 4 days.  Start taking on: 2021  Notes to patient: Birth control            STOP taking these medications      LATUDA PO               Where to Get Your Medications        [id number] - [address] -  [phone] - F [phone]  [address] [place], [place] [id number]      Phone: [phone]   risperiDONE 1 MG tablet         Follow Up Appointment: Group, [facility]  [facility] [address] [phone]    Go on 2025  At 2:40PM- Mental health medication management appointment